# Patient Record
Sex: FEMALE | Race: WHITE | NOT HISPANIC OR LATINO | Employment: UNEMPLOYED | URBAN - NONMETROPOLITAN AREA
[De-identification: names, ages, dates, MRNs, and addresses within clinical notes are randomized per-mention and may not be internally consistent; named-entity substitution may affect disease eponyms.]

---

## 2018-06-06 ENCOUNTER — APPOINTMENT (OUTPATIENT)
Dept: CT IMAGING | Facility: HOSPITAL | Age: 28
End: 2018-06-06

## 2018-06-06 ENCOUNTER — HOSPITAL ENCOUNTER (INPATIENT)
Facility: HOSPITAL | Age: 28
LOS: 2 days | Discharge: HOME | End: 2018-06-08
Attending: PSYCHIATRY & NEUROLOGY | Admitting: PSYCHIATRY & NEUROLOGY

## 2018-06-06 ENCOUNTER — APPOINTMENT (OUTPATIENT)
Dept: GENERAL RADIOLOGY | Facility: HOSPITAL | Age: 28
End: 2018-06-06

## 2018-06-06 ENCOUNTER — HOSPITAL ENCOUNTER (EMERGENCY)
Facility: HOSPITAL | Age: 28
Discharge: STILL ADMITTED OR ADMINISTRATIVE | End: 2018-06-06
Attending: EMERGENCY MEDICINE

## 2018-06-06 VITALS
RESPIRATION RATE: 16 BRPM | DIASTOLIC BLOOD PRESSURE: 69 MMHG | HEART RATE: 73 BPM | OXYGEN SATURATION: 99 % | TEMPERATURE: 97.5 F | SYSTOLIC BLOOD PRESSURE: 101 MMHG | WEIGHT: 157.6 LBS | HEIGHT: 65 IN | BODY MASS INDEX: 26.26 KG/M2

## 2018-06-06 DIAGNOSIS — R45.851 SUICIDAL IDEATIONS: Primary | ICD-10-CM

## 2018-06-06 LAB
6-ACETYL MORPHINE: NEGATIVE
ALBUMIN SERPL-MCNC: 4.4 G/DL (ref 3.5–5)
ALBUMIN/GLOB SERPL: 1.4 G/DL (ref 1.5–2.5)
ALP SERPL-CCNC: 92 U/L (ref 35–104)
ALT SERPL W P-5'-P-CCNC: 15 U/L (ref 10–36)
AMPHET+METHAMPHET UR QL: POSITIVE
ANION GAP SERPL CALCULATED.3IONS-SCNC: 3.3 MMOL/L (ref 3.6–11.2)
APAP SERPL-MCNC: <10 MCG/ML (ref 0–200)
AST SERPL-CCNC: 18 U/L (ref 10–30)
B-HCG UR QL: NEGATIVE
BARBITURATES UR QL SCN: NEGATIVE
BASOPHILS # BLD AUTO: 0.04 10*3/MM3 (ref 0–0.3)
BASOPHILS NFR BLD AUTO: 0.5 % (ref 0–2)
BENZODIAZ UR QL SCN: NEGATIVE
BILIRUB SERPL-MCNC: 0.8 MG/DL (ref 0.2–1.8)
BUN BLD-MCNC: 16 MG/DL (ref 7–21)
BUN/CREAT SERPL: 20.8 (ref 7–25)
BUPRENORPHINE SERPL-MCNC: NEGATIVE NG/ML
CALCIUM SPEC-SCNC: 9.2 MG/DL (ref 7.7–10)
CANNABINOIDS SERPL QL: POSITIVE
CHLORIDE SERPL-SCNC: 107 MMOL/L (ref 99–112)
CO2 SERPL-SCNC: 25.7 MMOL/L (ref 24.3–31.9)
COCAINE UR QL: NEGATIVE
CREAT BLD-MCNC: 0.77 MG/DL (ref 0.43–1.29)
DEPRECATED RDW RBC AUTO: 46.1 FL (ref 37–54)
EOSINOPHIL # BLD AUTO: 0.07 10*3/MM3 (ref 0–0.7)
EOSINOPHIL NFR BLD AUTO: 0.9 % (ref 0–5)
ERYTHROCYTE [DISTWIDTH] IN BLOOD BY AUTOMATED COUNT: 14 % (ref 11.5–14.5)
GFR SERPL CREATININE-BSD FRML MDRD: 89 ML/MIN/1.73
GLOBULIN UR ELPH-MCNC: 3.2 GM/DL
GLUCOSE BLD-MCNC: 99 MG/DL (ref 70–110)
HAV IGM SERPL QL IA: ABNORMAL
HBV CORE IGM SERPL QL IA: ABNORMAL
HBV SURFACE AG SERPL QL IA: ABNORMAL
HCT VFR BLD AUTO: 41.6 % (ref 37–47)
HCV AB SER DONR QL: REACTIVE
HGB BLD-MCNC: 13.8 G/DL (ref 12–16)
HIV1+2 AB SER QL: NORMAL
HOLD SPECIMEN: NORMAL
HOLD SPECIMEN: NORMAL
IMM GRANULOCYTES # BLD: 0.01 10*3/MM3 (ref 0–0.03)
IMM GRANULOCYTES NFR BLD: 0.1 % (ref 0–0.5)
LIPASE SERPL-CCNC: 30 U/L (ref 13–60)
LYMPHOCYTES # BLD AUTO: 1.82 10*3/MM3 (ref 1–3)
LYMPHOCYTES NFR BLD AUTO: 24.7 % (ref 21–51)
MCH RBC QN AUTO: 31.2 PG (ref 27–33)
MCHC RBC AUTO-ENTMCNC: 33.2 G/DL (ref 33–37)
MCV RBC AUTO: 94.1 FL (ref 80–94)
METHADONE UR QL SCN: NEGATIVE
MONOCYTES # BLD AUTO: 0.62 10*3/MM3 (ref 0.1–0.9)
MONOCYTES NFR BLD AUTO: 8.4 % (ref 0–10)
NEUTROPHILS # BLD AUTO: 4.81 10*3/MM3 (ref 1.4–6.5)
NEUTROPHILS NFR BLD AUTO: 65.4 % (ref 30–70)
OPIATES UR QL: NEGATIVE
OSMOLALITY SERPL CALC.SUM OF ELEC: 273.2 MOSM/KG (ref 273–305)
OXYCODONE UR QL SCN: NEGATIVE
PCP UR QL SCN: NEGATIVE
PLATELET # BLD AUTO: 290 10*3/MM3 (ref 130–400)
PMV BLD AUTO: 9.9 FL (ref 6–10)
POTASSIUM BLD-SCNC: 3.6 MMOL/L (ref 3.5–5.3)
PROT SERPL-MCNC: 7.6 G/DL (ref 6–8)
RBC # BLD AUTO: 4.42 10*6/MM3 (ref 4.2–5.4)
SALICYLATES SERPL-MCNC: <1 MG/DL (ref 0–30)
SODIUM BLD-SCNC: 136 MMOL/L (ref 135–153)
TROPONIN I SERPL-MCNC: <0.006 NG/ML
WBC NRBC COR # BLD: 7.37 10*3/MM3 (ref 4.5–12.5)
WHOLE BLOOD HOLD SPECIMEN: NORMAL
WHOLE BLOOD HOLD SPECIMEN: NORMAL

## 2018-06-06 PROCEDURE — 80307 DRUG TEST PRSMV CHEM ANLYZR: CPT | Performed by: EMERGENCY MEDICINE

## 2018-06-06 PROCEDURE — 25010000002 DIPHENHYDRAMINE PER 50 MG: Performed by: EMERGENCY MEDICINE

## 2018-06-06 PROCEDURE — 93005 ELECTROCARDIOGRAM TRACING: CPT | Performed by: EMERGENCY MEDICINE

## 2018-06-06 PROCEDURE — 71275 CT ANGIOGRAPHY CHEST: CPT

## 2018-06-06 PROCEDURE — 71275 CT ANGIOGRAPHY CHEST: CPT | Performed by: RADIOLOGY

## 2018-06-06 PROCEDURE — 71045 X-RAY EXAM CHEST 1 VIEW: CPT

## 2018-06-06 PROCEDURE — 83690 ASSAY OF LIPASE: CPT | Performed by: EMERGENCY MEDICINE

## 2018-06-06 PROCEDURE — 25010000002 METHYLPREDNISOLONE PER 125 MG: Performed by: EMERGENCY MEDICINE

## 2018-06-06 PROCEDURE — 71045 X-RAY EXAM CHEST 1 VIEW: CPT | Performed by: RADIOLOGY

## 2018-06-06 PROCEDURE — 99223 1ST HOSP IP/OBS HIGH 75: CPT | Performed by: PSYCHIATRY & NEUROLOGY

## 2018-06-06 PROCEDURE — 80053 COMPREHEN METABOLIC PANEL: CPT | Performed by: EMERGENCY MEDICINE

## 2018-06-06 PROCEDURE — 85025 COMPLETE CBC W/AUTO DIFF WBC: CPT | Performed by: EMERGENCY MEDICINE

## 2018-06-06 PROCEDURE — 0 IOPAMIDOL PER 1 ML: Performed by: EMERGENCY MEDICINE

## 2018-06-06 PROCEDURE — 84484 ASSAY OF TROPONIN QUANT: CPT | Performed by: EMERGENCY MEDICINE

## 2018-06-06 PROCEDURE — 81025 URINE PREGNANCY TEST: CPT | Performed by: EMERGENCY MEDICINE

## 2018-06-06 PROCEDURE — 93010 ELECTROCARDIOGRAM REPORT: CPT | Performed by: INTERNAL MEDICINE

## 2018-06-06 PROCEDURE — G0432 EIA HIV-1/HIV-2 SCREEN: HCPCS | Performed by: PSYCHIATRY & NEUROLOGY

## 2018-06-06 PROCEDURE — 80074 ACUTE HEPATITIS PANEL: CPT | Performed by: PSYCHIATRY & NEUROLOGY

## 2018-06-06 RX ORDER — FAMOTIDINE 20 MG/1
20 TABLET, FILM COATED ORAL 2 TIMES DAILY PRN
Status: DISCONTINUED | OUTPATIENT
Start: 2018-06-06 | End: 2018-06-08 | Stop reason: HOSPADM

## 2018-06-06 RX ORDER — IBUPROFEN 600 MG/1
600 TABLET ORAL EVERY 6 HOURS PRN
Status: DISCONTINUED | OUTPATIENT
Start: 2018-06-06 | End: 2018-06-08 | Stop reason: HOSPADM

## 2018-06-06 RX ORDER — BENZTROPINE MESYLATE 1 MG/1
1 TABLET ORAL DAILY PRN
Status: DISCONTINUED | OUTPATIENT
Start: 2018-06-06 | End: 2018-06-08 | Stop reason: HOSPADM

## 2018-06-06 RX ORDER — ONDANSETRON 4 MG/1
4 TABLET, FILM COATED ORAL EVERY 6 HOURS PRN
Status: DISCONTINUED | OUTPATIENT
Start: 2018-06-06 | End: 2018-06-08 | Stop reason: HOSPADM

## 2018-06-06 RX ORDER — NICOTINE 21 MG/24HR
1 PATCH, TRANSDERMAL 24 HOURS TRANSDERMAL DAILY
Status: DISCONTINUED | OUTPATIENT
Start: 2018-06-06 | End: 2018-06-07

## 2018-06-06 RX ORDER — BENZTROPINE MESYLATE 1 MG/ML
0.5 INJECTION INTRAMUSCULAR; INTRAVENOUS DAILY PRN
Status: DISCONTINUED | OUTPATIENT
Start: 2018-06-06 | End: 2018-06-08 | Stop reason: HOSPADM

## 2018-06-06 RX ORDER — BENZONATATE 100 MG/1
100 CAPSULE ORAL 3 TIMES DAILY PRN
Status: DISCONTINUED | OUTPATIENT
Start: 2018-06-06 | End: 2018-06-08 | Stop reason: HOSPADM

## 2018-06-06 RX ORDER — DIPHENHYDRAMINE HYDROCHLORIDE 50 MG/ML
50 INJECTION INTRAMUSCULAR; INTRAVENOUS ONCE AS NEEDED
Status: DISCONTINUED | OUTPATIENT
Start: 2018-06-06 | End: 2018-06-08 | Stop reason: HOSPADM

## 2018-06-06 RX ORDER — TRAZODONE HYDROCHLORIDE 50 MG/1
50 TABLET ORAL NIGHTLY PRN
Status: DISCONTINUED | OUTPATIENT
Start: 2018-06-06 | End: 2018-06-08 | Stop reason: HOSPADM

## 2018-06-06 RX ORDER — METHYLPREDNISOLONE SODIUM SUCCINATE 125 MG/2ML
125 INJECTION, POWDER, LYOPHILIZED, FOR SOLUTION INTRAMUSCULAR; INTRAVENOUS ONCE
Status: COMPLETED | OUTPATIENT
Start: 2018-06-06 | End: 2018-06-06

## 2018-06-06 RX ORDER — HALOPERIDOL 5 MG/ML
5 INJECTION INTRAMUSCULAR ONCE AS NEEDED
Status: DISCONTINUED | OUTPATIENT
Start: 2018-06-06 | End: 2018-06-08 | Stop reason: HOSPADM

## 2018-06-06 RX ORDER — FAMOTIDINE 10 MG/ML
20 INJECTION, SOLUTION INTRAVENOUS ONCE
Status: COMPLETED | OUTPATIENT
Start: 2018-06-06 | End: 2018-06-06

## 2018-06-06 RX ORDER — SODIUM CHLORIDE 0.9 % (FLUSH) 0.9 %
10 SYRINGE (ML) INJECTION AS NEEDED
Status: DISCONTINUED | OUTPATIENT
Start: 2018-06-06 | End: 2018-06-06 | Stop reason: HOSPADM

## 2018-06-06 RX ORDER — HYDROXYZINE 50 MG/1
50 TABLET, FILM COATED ORAL EVERY 6 HOURS PRN
Status: DISCONTINUED | OUTPATIENT
Start: 2018-06-06 | End: 2018-06-08 | Stop reason: HOSPADM

## 2018-06-06 RX ORDER — OLANZAPINE 5 MG/1
5 TABLET, ORALLY DISINTEGRATING ORAL EVERY 6 HOURS PRN
Status: DISCONTINUED | OUTPATIENT
Start: 2018-06-06 | End: 2018-06-08 | Stop reason: HOSPADM

## 2018-06-06 RX ORDER — ECHINACEA PURPUREA EXTRACT 125 MG
2 TABLET ORAL AS NEEDED
Status: DISCONTINUED | OUTPATIENT
Start: 2018-06-06 | End: 2018-06-08 | Stop reason: HOSPADM

## 2018-06-06 RX ORDER — LOPERAMIDE HYDROCHLORIDE 2 MG/1
2 CAPSULE ORAL 4 TIMES DAILY PRN
Status: DISCONTINUED | OUTPATIENT
Start: 2018-06-06 | End: 2018-06-08 | Stop reason: HOSPADM

## 2018-06-06 RX ORDER — LORAZEPAM 2 MG/ML
2 INJECTION INTRAMUSCULAR ONCE AS NEEDED
Status: DISCONTINUED | OUTPATIENT
Start: 2018-06-06 | End: 2018-06-08 | Stop reason: HOSPADM

## 2018-06-06 RX ORDER — HALOPERIDOL 5 MG/ML
5 INJECTION INTRAMUSCULAR ONCE
Status: DISCONTINUED | OUTPATIENT
Start: 2018-06-06 | End: 2018-06-06

## 2018-06-06 RX ORDER — ALUMINA, MAGNESIA, AND SIMETHICONE 2400; 2400; 240 MG/30ML; MG/30ML; MG/30ML
15 SUSPENSION ORAL EVERY 6 HOURS PRN
Status: DISCONTINUED | OUTPATIENT
Start: 2018-06-06 | End: 2018-06-08 | Stop reason: HOSPADM

## 2018-06-06 RX ORDER — DIPHENHYDRAMINE HYDROCHLORIDE 50 MG/ML
25 INJECTION INTRAMUSCULAR; INTRAVENOUS ONCE
Status: COMPLETED | OUTPATIENT
Start: 2018-06-06 | End: 2018-06-06

## 2018-06-06 RX ORDER — ASPIRIN 325 MG
325 TABLET ORAL ONCE
Status: COMPLETED | OUTPATIENT
Start: 2018-06-06 | End: 2018-06-06

## 2018-06-06 RX ADMIN — DIPHENHYDRAMINE HYDROCHLORIDE 25 MG: 50 INJECTION INTRAMUSCULAR; INTRAVENOUS at 05:51

## 2018-06-06 RX ADMIN — METHYLPREDNISOLONE SODIUM SUCCINATE 125 MG: 125 INJECTION, POWDER, FOR SOLUTION INTRAMUSCULAR; INTRAVENOUS at 05:52

## 2018-06-06 RX ADMIN — FAMOTIDINE 20 MG: 10 INJECTION INTRAVENOUS at 05:49

## 2018-06-06 RX ADMIN — IOPAMIDOL 95 ML: 755 INJECTION, SOLUTION INTRAVENOUS at 06:28

## 2018-06-06 RX ADMIN — ASPIRIN 325 MG: 325 TABLET ORAL at 05:53

## 2018-06-06 NOTE — NURSING NOTE
Face to Face Evaluation for Restraint/Seculsion    Behavior Observed: PT ASSISTED BACK INTO INTAKE ROOM BY STAFF, PT CALMER BUT STILL VOICING THE NEED TO LEAVE, NO INJURY TO STAFF OR PT.    Recommendations: BRIEF HOLD DISCONTINUED.    Rochelle Buchanan RN  06/06/18  8:06 AM

## 2018-06-06 NOTE — NURSING NOTE
Talked with ED lead, JOSELYN Childers. Requested she have MD put in brief hold x 2 for [id number] and 5968. Reports that she has done so.

## 2018-06-06 NOTE — NURSING NOTE
Patient is calm at this time and does not appear agitated. She does however appear a little drowsy.  ED staff reports that she had benadryl earlier and has been resting since. Pt vague with some responses and appears withdrawn.  Patient  reports that she has been having problems with depression for years since she was a teen.currently she has no outpt providers or medications. She does report though that she has been a heavy methamphetamine user for several years and has used up to a gm daily for the past two or so years. She also admits to occasional use of pot. She denies using any other drugs or etoh. She reports that she just has a lot going on and yes she was suicidal. Noted that pt threatened to run into traffic at some point in her visit. Pt asked about this and she nodded her head up and down but refused to provider a verbal answer. Pt asked to rate anxiety and depression. Anxiety rated 10/10 and depression rated a 7/10. Patient seen and cleared medically for psych. She denies any complaints at this time. V/S stable.

## 2018-06-06 NOTE — NURSING NOTE
Patient in TX room, released from first brief hold, cursing and threatening staff, patient punched wall, pushing at staff. Place in brief hold, less than one minute until calm.

## 2018-06-06 NOTE — ED NOTES
Pt resting quietly on stretcher sleeping with ability to arouse.  Pt AAOx4 with no resp distress noted, respirations even and unlabored.  Pt denies any needs at this time.  Skin PWD. Will continue to monitor and follow plan of care.  Bed rails up x2, bed in lowest position, call light in reach.       Loulou Morales RN  06/06/18 8770

## 2018-06-06 NOTE — H&P
"INITIAL PSYCHIATRIC HISTORY & PHYSICAL    Patient Identification:  Name:   Cierra Perry  Age:   28 y.o.  Sex:   female  :   1990  MRN:   [id number]  Visit Number:   [id number]  Primary Care Physician:   No Known Provider    SUBJECTIVE    CC: agitation, suicidal ideation , recent ingestion     HPI: Cierra Perry is a 28 y.o. female who was admitted on 2018 with complaints of suicidal ideation. Patient presented to [facility] via [employer] [facility]. It is reported that she was a patient in [employer] r/t ativan overdose 2 days ago when she eloped  ICU and was taken to ER x 2 and sent here by police. Patient however, reports  when she apparently woke up and left AMA.  According to intake, patient became very agitated in ER, verbalized suicidal threats and was reportedly punching self in face, attempting to punch and kick staff requiring medication . Per note, patient stated she took 20-1 mg Ativan in effort to \" feel better\"., prior to admit at  [employer].  UDS is positive for amphetamine and thc.  Collateral information has been gathered r/t patient mental status.      Patient has endorsed history of depression and anxiety. She's reported  difficulty coping with worsening depression, low mood sadness, anxiousness. According to intake she reported history of IV substance use for last 10 years and including Methamphetamine.  According intake she reported history of pleuritic chest pain. Upon admission to the Unit the patient was noted demanding and irritable. She eventually calmed reporting  fatigue and has been resting, sleeping in room most of the shift.  Interview completed at bedside per patient request r/t  fatigue. Patient remains irritable, withdrawn and minimally cooperative. She denies current suicidal or homicidal ideation. She denies hallucination. She reports fatigue and denies any other complaints. She has reported poor sleep and appetite. She denies any complaints of chest pain or " discomfort. She was admitted to the Adult Psychiatric Unit for safety and further stabilization.         Noted see completed  XR Chest, CT angiogram chest and ECG lead     PAST PSYCHIATRIC HX:    History is unknown. Patient is poor historian at present. Will continue to attempt to gather information.     SUBSTANCE USE HX:  Uds is positive for amphetamine and THC.  See hpi for current use . No etoh.  She has reported smoking 5 ppd cigarettes and me    SOCIAL HX:  Patient is single, no children born and raised in [place], [place]. Parents were  , Mother in [facility] and Dad is , 5 Sibling. She currently lives with Mother. She is high school educated and unemployed, no  experience     Past Medical History:   Diagnosis Date   • Anxiety    • Depression    • PTSD (post-traumatic stress disorder)    • Substance abuse    • Suicide attempt     last one a few days ago. Overdose of benzodiazapines       No past surgical history on file.    No family history on file.      No prescriptions prior to admission.       Reviewed available past medical and psychiatric records.    ALLERGIES:  Geodon [ziprasidone hcl] and Shellfish-derived products    Temp:  [97.8 °F (36.6 °C)-99.3 °F (37.4 °C)] 98 °F (36.7 °C)  Heart Rate:  [72-82] 82  Resp:  [18] 18  BP: (102-112)/(63-68) 108/68    REVIEW OF SYSTEMS:  Review of Systems   Constitutional: Negative.    HENT: Negative.    Eyes: Negative.    Respiratory: Negative.    Cardiovascular: Negative.    Gastrointestinal: Negative.    Endocrine: Negative.    Genitourinary: Negative.    Musculoskeletal: Negative.    Skin: Negative.    Allergic/Immunologic: Negative.    Neurological: Negative.    Hematological: Negative.    Psychiatric/Behavioral: Negative.       See HPI for psychiatric ROS  OBJECTIVE    PHYSICAL EXAM:  Physical Exam   Constitutional: She is oriented to person, place, and time. She appears well-developed and well-nourished.   HENT:   Head: Normocephalic and  atraumatic.   Right Ear: External ear normal.   Left Ear: External ear normal.   Nose: Nose normal.   Mouth/Throat: Oropharynx is clear and moist.   Eyes: Conjunctivae and EOM are normal. Pupils are equal, round, and reactive to light.   Neck: Normal range of motion. Neck supple.   Cardiovascular: Normal rate, regular rhythm and normal heart sounds.    Pulmonary/Chest: Effort normal and breath sounds normal.   Abdominal: Soft. Bowel sounds are normal.   Musculoskeletal: Normal range of motion.   Neurological: She is alert and oriented to person, place, and time. She has normal reflexes. No cranial nerve deficit.   Skin: Skin is warm and dry.   Vitals reviewed.      MENTAL STATUS EXAM:    Cooperation:  Cooperative  Eye Contact:  Fair  Psychomotor Behavior:  Restless  Affect:  Appropriate  Hopelessness: Denies  Speech:  Normal  Thought Progress:  Preoccupied w/ going home   Thought Content:  Mood congurent  Suicidal:  None  Homicidal:  None  Hallucinations:  None  Delusion:  None  Memory:  Intact  Orientation:  Person, Time and Situation  Reliability:  fair  Insight:  Poor  Judgement:  Poor  Impulse Control:  Poor  Physical/Medical Issues:  See medical list       Imaging Results (last 24 hours)     ** No results found for the last 24 hours. **           ECG/EMG Results (most recent)     None           Lab Results   Component Value Date    GLUCOSE 99 06/06/2018    BUN 16 06/06/2018    CREATININE 0.77 06/06/2018    EGFRIFNONA 89 06/06/2018    BCR 20.8 06/06/2018    CO2 25.7 06/06/2018    CALCIUM 9.2 06/06/2018    ALBUMIN 4.40 06/06/2018    LABIL2 1.4 (L) 06/06/2018    AST 18 06/06/2018    ALT 15 06/06/2018       Lab Results   Component Value Date    WBC 7.37 06/06/2018    HGB 13.8 06/06/2018    HCT 41.6 06/06/2018    MCV 94.1 (H) 06/06/2018     06/06/2018       Pain Management Panel     Pain Management Panel Latest Ref Rng & Units 6/6/2018    AMPHETAMINES SCREEN, URINE Negative Positive(A)    BARBITURATES SCREEN  Negative Negative    BENZODIAZEPINE SCREEN, URINE Negative Negative    BUPRENORPHINE Negative Negative    COCAINE SCREEN, URINE Negative Negative    METHADONE SCREEN, URINE Negative Negative          Brief Urine Lab Results  (Last result in the past 365 days)      Color   Clarity   Blood   Leuk Est   Nitrite   Protein   CREAT   Urine HCG        06/06/18 0535               Negative           Reviewed labs and studies done with this admission.       ASSESSMENT & PLAN:      Patient Active Problem List   Diagnosis Code   • Suicidal ideation R45.851   • Severe recurrent major depression with psychotic features F33.3   • PTSD (post-traumatic stress disorder) F43.10   • Methamphetamine dependence F15.20   • Cannabis dependence, continuous F12.20     The patient is high risk due to severe depression, suicidal ideation, psychosis, recent overdose and substance use.    The patient has been admitted for safety and stabilization.  Patient will be monitored for suicidality daily and maintained on Suicide precaution Level 3 (q15 min checks) .  The patient will have individual and group therapy with a master's level therapist. A master treatment plan will be developed and agreed upon by the patient and his/her treatment team.  The patient's estimated length of stay in the hospital is 5-7 days.       This note was generated using a scribe, Nadiya Andersen RN .  The work documented in this note was completed, reviewed, and approved by the attending psychiatrist as designated Dr. ISADORA archibald.

## 2018-06-06 NOTE — NURSING NOTE
Talked with leadLiseth RN, requested she ask MD to put in restraint orders, and to DC them. Rochelle Duenas RN aware.

## 2018-06-06 NOTE — ED NOTES
Patient states that she has an allergy to shellfish and shrimp and is unsure if she can have the IV contrast dye. Provider notified, states the dye is not the same but will pre medicate patient before CT scan, patient verbalizes understanding and is agreeable to CT scan.     Deepak Berman RN  06/06/18 0663

## 2018-06-06 NOTE — NURSING NOTE
Pt moved to ED 15 for medical clearance. Pt calm, tearful. Able to answer questions. No acute distress noted.

## 2018-06-06 NOTE — ED PROVIDER NOTES
"Subjective   Patient is a 28-year-old lady who was brought in by police.  Apparently the patient was seen for an Ativan overdose 2 days ago at [place] emergency department was admitted to the ICU.  Earlier today the patient eloped from the ICU and was taken back to the ER.  The patient left the ER 2 different times and in the police brought her here.  The patient cannot give me a reason why she took as many Ativan as she did.  She simply states \"I have really high anxiety and needed it\".  Patient currently this denies suicidal or homicidal ideation.  That said she has a history of suicide attempts in the past.  When I first examined her in the presence of the psych nurse she said \"just let me leave and I will   walk in front of a train because I am suicidal\" area and the patient also states that she's been having some pleuritic chest pain.  She has a history of IV drug abuse for the last 10 years.  She denies a history of endocarditis. No f/c/n/v/abd pain.              Review of Systems   Constitutional: Negative.  Negative for fever.   HENT: Negative.    Respiratory: Positive for shortness of breath.    Cardiovascular: Positive for chest pain.   Gastrointestinal: Negative.  Negative for abdominal pain.   Endocrine: Negative.    Genitourinary: Negative.  Negative for dysuria.   Skin: Negative.    Neurological: Negative.    Psychiatric/Behavioral: Negative.    All other systems reviewed and are negative.      Past Medical History:   Diagnosis Date   • Anxiety    • Depression    • PTSD (post-traumatic stress disorder)        Allergies   Allergen Reactions   • Geodon [Ziprasidone Hcl]      rash   • Shellfish-Derived Products Swelling       History reviewed. No pertinent surgical history.    History reviewed. No pertinent family history.    Social History     Social History   • Marital status: Single     Social History Main Topics   • Smoking status: Current Every Day Smoker     Packs/day: 5.00     Types: " Cigarettes   • Alcohol use No   • Drug use: Yes     Types: Methamphetamines, IV     Other Topics Concern   • Not on file           Objective   Physical Exam   Constitutional: She is oriented to person, place, and time. She appears well-developed and well-nourished. No distress.   HENT:   Head: Normocephalic and atraumatic.   Right Ear: External ear normal.   Left Ear: External ear normal.   Nose: Nose normal.   Eyes: Conjunctivae and EOM are normal. Pupils are equal, round, and reactive to light.   Neck: Normal range of motion. Neck supple. No JVD present. No tracheal deviation present.   Cardiovascular: Normal rate, regular rhythm and normal heart sounds.    No murmur heard.  Pulmonary/Chest: Effort normal and breath sounds normal. No respiratory distress. She has no wheezes.   Abdominal: Soft. Bowel sounds are normal. There is no tenderness.   Musculoskeletal: Normal range of motion. She exhibits no edema or deformity.   Neurological: She is alert and oriented to person, place, and time. No cranial nerve deficit.   Skin: Skin is warm and dry. Capillary refill takes less than 2 seconds. She is not diaphoretic. No erythema. No pallor.   Psychiatric:   Flat affect.  She became violent and angry shortly after I started talking to her.  She was able to calmed down after being spoken with.  The patient denies homicidal or suicidal ideation currently but  when I first examined her she said she did want to walk in front of a train was she suicidal.  She now states she only said that because she was angry.  She apparenlty said she wanted to hurt herself at the outside ER earlier this evening as well.    Vitals reviewed.      Procedures           ED Course  ED Course as of Jun 06 0746 Wed Jun 06, 2018   0705 Normal sinus, normal axis, normal interval, normal QRS. Nonspecific st/t wave changes no STEMI criteria    [NB]   0714 No CT head is warranted at this time.  Patient is alert and oriented ×3.  She has normal  neurologic exam.  [NB]   0729 Pt signed out to dr razo at shift change  [NB]      ED Course User Index  [NB] Jason Ladd MD                  MDM  Number of Diagnoses or Management Options     Amount and/or Complexity of Data Reviewed  Clinical lab tests: ordered and reviewed          Final diagnoses:   None            Jason Ladd MD  06/06/18 9857

## 2018-06-06 NOTE — NURSING NOTE
"Talking with patient about who brought her in and why. Pt upset, requesting to have phone back and to go outside to smoke. Rules explained. Pt cursing staff, denies suicidal ideation, reports she had been in ICU at [facility], she left AMA because they wouldn't let her smoke. She then went back to the ED at [facility] who sent her here by police. Pt becomes upset with questioning, punches self in face, screams that she will leave and kill herself that she is suicidal, and states that it's \"untelling who she will kill.\" Demanding to smoke, attempted to leave and stopped by staff. Brief hold in place. Pt walked to TX room by staff/security. Pt cursing, trying to punch and kick staff.   "

## 2018-06-06 NOTE — NURSING NOTE
Patient states that she has changed her mind and wants her fucking clothes to get the hell out of here. Explained hold process to patient and instructed her that she was on a 72 hr hold and that she could discuss it further with the dr once she sees him. Security present and patient escorted to floor with belongings.

## 2018-06-06 NOTE — ED NOTES
Report received from Elodia Berman RN at bedside.  Pt resting quietly on stretcher sleeping with ability to arouse.  Pt AAOx4 with no resp distress noted, respirations even and unlabored.  Pt denies any needs at this time.  Skin PWD. Will continue to monitor and follow plan of care.  Bed rails up x2, bed in lowest position, call light in reach.       Loulou Morales RN  06/06/18 6882

## 2018-06-06 NOTE — ED NOTES
"Patient presents to the ER tonight due to increased anxiety and depression and that has been ongoing. Patient states that she has been in [facility] multiple times, states that she was admitted to the ICU in [place] and left yesterday morning after she woke up. Patient states that she was admitted after taking twenty 1 mg ativan tablets in attempt to try to \"feel better.\" Patient states that she was not trying to harm herself when taking the medication. Patient states that she walked out, but reports that her depression has worsened and wanted to be seen. When patient arrived patient became uncooperative and attempted to leave after saying \"I'm just going to throw myself out in front of a bus.\" Patient cooperative at this time, states that she has a lot going on at home and states she would be better off if she were dead. Patient states that she has a history of IV methamphetamine use, reports that she also has a cardiac history and reports that she has been experiencing chest pain for the past 4 days, states that she was seen in the ER at [facility], but reports that she was told that her heart checked out okay. Patient updated on plan of care, patient placed in room in front of nurses station. Fall reduction program in place, respirations are even and unlabored, NADN, will continue to monitor and follow plan of care.     Deepak Berman RN  06/06/18 0609    "

## 2018-06-06 NOTE — NURSING NOTE
Called and spoke to Dr. Lundberg. Reviewed information and labs and notes from ED chart. Instructed to admit pt and place her on a 72 hour hold. Rbvox2. Pt and ed provider made aware of plan of care.

## 2018-06-07 PROBLEM — F33.3 SEVERE RECURRENT MAJOR DEPRESSION WITH PSYCHOTIC FEATURES (HCC): Status: ACTIVE | Noted: 2018-06-07

## 2018-06-07 PROBLEM — F43.10 PTSD (POST-TRAUMATIC STRESS DISORDER): Status: ACTIVE | Noted: 2018-06-07

## 2018-06-07 PROBLEM — F15.20 METHAMPHETAMINE DEPENDENCE (HCC): Status: ACTIVE | Noted: 2018-06-07

## 2018-06-07 PROBLEM — F12.20 CANNABIS DEPENDENCE, CONTINUOUS (HCC): Status: ACTIVE | Noted: 2018-06-07

## 2018-06-07 PROCEDURE — 99232 SBSQ HOSP IP/OBS MODERATE 35: CPT | Performed by: PSYCHIATRY & NEUROLOGY

## 2018-06-07 RX ORDER — VENLAFAXINE HYDROCHLORIDE 37.5 MG/1
37.5 CAPSULE, EXTENDED RELEASE ORAL
Status: DISCONTINUED | OUTPATIENT
Start: 2018-06-07 | End: 2018-06-08 | Stop reason: HOSPADM

## 2018-06-07 RX ORDER — LAMOTRIGINE 100 MG/1
25 TABLET ORAL DAILY
Status: DISCONTINUED | OUTPATIENT
Start: 2018-06-07 | End: 2018-06-08 | Stop reason: HOSPADM

## 2018-06-07 RX ORDER — LURASIDONE HYDROCHLORIDE 40 MG/1
40 TABLET, FILM COATED ORAL DAILY
Status: DISCONTINUED | OUTPATIENT
Start: 2018-06-07 | End: 2018-06-08 | Stop reason: HOSPADM

## 2018-06-07 RX ADMIN — LAMOTRIGINE 25 MG: 100 TABLET ORAL at 14:31

## 2018-06-07 RX ADMIN — LURASIDONE HYDROCHLORIDE 40 MG: 40 TABLET, FILM COATED ORAL at 14:30

## 2018-06-07 RX ADMIN — NICOTINE POLACRILEX 2 MG: 2 GUM, CHEWING ORAL at 18:19

## 2018-06-07 RX ADMIN — VENLAFAXINE HYDROCHLORIDE 37.5 MG: 37.5 CAPSULE, EXTENDED RELEASE ORAL at 14:30

## 2018-06-07 RX ADMIN — HYDROXYZINE HYDROCHLORIDE 50 MG: 50 TABLET ORAL at 07:27

## 2018-06-07 NOTE — PROGRESS NOTES
Navigator is assisting primary therapist with discharge planning. A referral was sent to [facility] on this date for review. Patient has also consented to [facility].    [facility]  [phone]

## 2018-06-07 NOTE — DISCHARGE PLACEMENT REQUEST
"Cierra Barakat  (28 y.o. Female)     Date of Birth Social Security Number Address Home Phone MRN    1990  [address]   [place] [address] [phone] [id number]    Oriental orthodox Marital Status          None Single       Admission Date Admission Type Admitting Provider Attending Provider Department, Room/Bed    18 Emergency Bradly Lundberg MD Salim, Mazhar, MD [facility] ADULT PSYCHIATRIC 2, 1038/1S    Discharge Date Discharge Disposition Discharge Destination                       Attending Provider:  Luis Winter MD    Allergies:  Geodon [Ziprasidone Hcl], Shellfish-derived Products    Isolation:  None   Infection:  None   Code Status:  FULL    Ht:  165.1 cm (65\")   Wt:  70.3 kg (155 lb)    Admission Cmt:  None   Principal Problem:  None                Active Insurance as of 2018     Primary Coverage     Payor Plan Insurance Group Employer/Plan Group    [id number]      Payor Plan Address Payor Plan Phone Number Effective From Effective To    [address]     [facility] [id number]       Subscriber Name Subscriber Birth Date Member ID       CIERRA BARAKAT 1990 [id number]                 Emergency Contacts      (Rel.) Home Phone Work Phone Mobile Phone    Grace Barakat (Mother) [phone] -- --            Insurance Information                [employer] Phone:     Subscriber: Cierra Barakat Subscriber#: [id number]    Group#:  Precert#:              History & Physical      Luis Winter MD at 2018  2:20 PM          INITIAL PSYCHIATRIC HISTORY & PHYSICAL    Patient Identification:  Name:   Cierra Barakat  Age:   28 y.o.  Sex:   female  :   1990  MRN:   [id number]  Visit Number:   [id number]  Primary Care Physician:   No Known Provider    SUBJECTIVE    CC: agitation, suicidal ideation , recent ingestion     HPI: Cierra Barakat is a 28 y.o. female who was admitted on 2018 with complaints of suicidal ideation. " "Patient presented to [facility] via [employer] [facility]. It is reported that she was a patient in [facility] r/t ativan overdose 2 days ago when she eloped  ICU and was taken to ER x 2 and sent here by police. Patient however, reports  when she apparently woke up and left AMA.  According to intake, patient became very agitated in ER, verbalized suicidal threats and was reportedly punching self in face, attempting to punch and kick staff requiring medication . Per note, patient stated she took 20-1 mg Ativan in effort to \" feel better\"., prior to admit at  [facility].  UDS is positive for amphetamine and thc.  Collateral information has been gathered r/t patient mental status.      Patient has endorsed history of depression and anxiety. She's reported  difficulty coping with worsening depression, low mood sadness, anxiousness. According to intake she reported history of IV substance use for last 10 years and including Methamphetamine.  According intake she reported history of pleuritic chest pain. Upon admission to the Unit the patient was noted demanding and irritable. She eventually calmed reporting  fatigue and has been resting, sleeping in room most of the shift.  Interview completed at bedside per patient request r/t  fatigue. Patient remains irritable, withdrawn and minimally cooperative. She denies current suicidal or homicidal ideation. She denies hallucination. She reports fatigue and denies any other complaints. She has reported poor sleep and appetite. She denies any complaints of chest pain or discomfort. She was admitted to the Adult Psychiatric Unit for safety and further stabilization.         Noted see completed  XR Chest, CT angiogram chest and ECG lead     PAST PSYCHIATRIC HX:    History is unknown. Patient is poor historian at present. Will continue to attempt to gather information.     SUBSTANCE USE HX:  Uds is positive for amphetamine and THC.  See hpi for current use . No etoh.  She has reported " smoking 5 ppd cigarettes and me    SOCIAL HX:  Patient is single, no children born and raised in [place], [place]. Parents were  , Mother in [facility] and Dad is , 5 Sibling. She currently lives with Mother. She is high school educated and unemployed, no  experience     Past Medical History:   Diagnosis Date   • Anxiety    • Depression    • PTSD (post-traumatic stress disorder)    • Substance abuse    • Suicide attempt     last one a few days ago. Overdose of benzodiazapines       No past surgical history on file.    No family history on file.      No prescriptions prior to admission.       Reviewed available past medical and psychiatric records.    ALLERGIES:  Geodon [ziprasidone hcl] and Shellfish-derived products    Temp:  [97.8 °F (36.6 °C)-99.3 °F (37.4 °C)] 98 °F (36.7 °C)  Heart Rate:  [72-82] 82  Resp:  [18] 18  BP: (102-112)/(63-68) 108/68    REVIEW OF SYSTEMS:  Review of Systems   Constitutional: Negative.    HENT: Negative.    Eyes: Negative.    Respiratory: Negative.    Cardiovascular: Negative.    Gastrointestinal: Negative.    Endocrine: Negative.    Genitourinary: Negative.    Musculoskeletal: Negative.    Skin: Negative.    Allergic/Immunologic: Negative.    Neurological: Negative.    Hematological: Negative.    Psychiatric/Behavioral: Negative.       See HPI for psychiatric ROS  OBJECTIVE    PHYSICAL EXAM:  Physical Exam   Constitutional: She is oriented to person, place, and time. She appears well-developed and well-nourished.   HENT:   Head: Normocephalic and atraumatic.   Right Ear: External ear normal.   Left Ear: External ear normal.   Nose: Nose normal.   Mouth/Throat: Oropharynx is clear and moist.   Eyes: Conjunctivae and EOM are normal. Pupils are equal, round, and reactive to light.   Neck: Normal range of motion. Neck supple.   Cardiovascular: Normal rate, regular rhythm and normal heart sounds.    Pulmonary/Chest: Effort normal and breath sounds normal.   Abdominal: Soft.  Bowel sounds are normal.   Musculoskeletal: Normal range of motion.   Neurological: She is alert and oriented to person, place, and time. She has normal reflexes. No cranial nerve deficit.   Skin: Skin is warm and dry.   Vitals reviewed.      MENTAL STATUS EXAM:    Cooperation:  Cooperative  Eye Contact:  Fair  Psychomotor Behavior:  Restless  Affect:  Appropriate  Hopelessness: Denies  Speech:  Normal  Thought Progress:  Preoccupied w/ going home   Thought Content:  Mood congurent  Suicidal:  None  Homicidal:  None  Hallucinations:  None  Delusion:  None  Memory:  Intact  Orientation:  Person, Time and Situation  Reliability:  fair  Insight:  Poor  Judgement:  Poor  Impulse Control:  Poor  Physical/Medical Issues:  See medical list       Imaging Results (last 24 hours)     ** No results found for the last 24 hours. **           ECG/EMG Results (most recent)     None           Lab Results   Component Value Date    GLUCOSE 99 06/06/2018    BUN 16 06/06/2018    CREATININE 0.77 06/06/2018    EGFRIFNONA 89 06/06/2018    BCR 20.8 06/06/2018    CO2 25.7 06/06/2018    CALCIUM 9.2 06/06/2018    ALBUMIN 4.40 06/06/2018    LABIL2 1.4 (L) 06/06/2018    AST 18 06/06/2018    ALT 15 06/06/2018       Lab Results   Component Value Date    WBC 7.37 06/06/2018    HGB 13.8 06/06/2018    HCT 41.6 06/06/2018    MCV 94.1 (H) 06/06/2018     06/06/2018       Pain Management Panel     Pain Management Panel Latest Ref Rng & Units 6/6/2018    AMPHETAMINES SCREEN, URINE Negative Positive(A)    BARBITURATES SCREEN Negative Negative    BENZODIAZEPINE SCREEN, URINE Negative Negative    BUPRENORPHINE Negative Negative    COCAINE SCREEN, URINE Negative Negative    METHADONE SCREEN, URINE Negative Negative          Brief Urine Lab Results  (Last result in the past 365 days)      Color   Clarity   Blood   Leuk Est   Nitrite   Protein   CREAT   Urine HCG        06/06/18 0535               Negative           Reviewed labs and studies done with  "this admission.       ASSESSMENT & PLAN:      Patient Active Problem List   Diagnosis Code   • Suicidal ideation R45.851   • Severe recurrent major depression with psychotic features F33.3   • PTSD (post-traumatic stress disorder) F43.10   • Methamphetamine dependence F15.20   • Cannabis dependence, continuous F12.20     The patient is high risk due to severe depression, suicidal ideation, psychosis, recent overdose and substance use.    The patient has been admitted for safety and stabilization.  Patient will be monitored for suicidality daily and maintained on Suicide precaution Level 3 (q15 min checks) .  The patient will have individual and group therapy with a master's level therapist. A master treatment plan will be developed and agreed upon by the patient and his/her treatment team.  The patient's estimated length of stay in the hospital is 5-7 days.       This note was generated using a scribe, Nadiya Andersen RN .  The work documented in this note was completed, reviewed, and approved by the attending psychiatrist as designated Dr. ISADORA Winter  signature.       Electronically signed by Luis Winter MD at 6/7/2018 12:47 PM       Hospital Medications (active)       Dose Frequency Start End    aluminum-magnesium hydroxide-simethicone (MAALOX MAX) 400-400-40 MG/5ML suspension 15 mL 15 mL Every 6 Hours PRN 6/6/2018     Sig - Route: Take 15 mL by mouth Every 6 (Six) Hours As Needed for Indigestion or Heartburn. - Oral    benzonatate (TESSALON) capsule 100 mg 100 mg 3 Times Daily PRN 6/6/2018     Sig - Route: Take 1 capsule by mouth 3 (Three) Times a Day As Needed for Cough. - Oral    benztropine (COGENTIN) injection 0.5 mg 0.5 mg Daily PRN 6/6/2018     Sig - Route: Inject 0.5 mL into the shoulder, thigh, or buttocks Daily As Needed (tremors). - Intramuscular    Linked Group 1:  \"Or\" Linked Group Details        benztropine (COGENTIN) tablet 1 mg 1 mg Daily PRN 6/6/2018     Sig - Route: Take 1 tablet by mouth Daily As " "Needed for Tremors. - Oral    Linked Group 1:  \"Or\" Linked Group Details        diphenhydrAMINE (BENADRYL) injection 50 mg 50 mg Once As Needed 6/6/2018     Sig - Route: Inject 1 mL into the shoulder, thigh, or buttocks 1 (One) Time As Needed for Itching. - Intramuscular    famotidine (PEPCID) tablet 20 mg 20 mg 2 Times Daily PRN 6/6/2018     Sig - Route: Take 1 tablet by mouth 2 (Two) Times a Day As Needed for Heartburn. - Oral    haloperidol lactate (HALDOL) injection 5 mg 5 mg Once As Needed 6/6/2018     Sig - Route: Inject 1 mL into the shoulder, thigh, or buttocks 1 (One) Time As Needed for Agitation. - Intramuscular    hydrOXYzine (ATARAX) tablet 50 mg 50 mg Every 6 Hours PRN 6/6/2018     Sig - Route: Take 1 tablet by mouth Every 6 (Six) Hours As Needed for Anxiety. - Oral    ibuprofen (ADVIL,MOTRIN) tablet 600 mg 600 mg Every 6 Hours PRN 6/6/2018     Sig - Route: Take 1 tablet by mouth Every 6 (Six) Hours As Needed for Mild Pain  or Moderate Pain  (severe pain (7-10)). - Oral    lamoTRIgine (LaMICtal) tablet 25 mg 25 mg Daily 6/7/2018     Sig - Route: Take 0.25 tablets by mouth Daily. - Oral    loperamide (IMODIUM) capsule 2 mg 2 mg 4 Times Daily PRN 6/6/2018     Sig - Route: Take 1 capsule by mouth 4 (Four) Times a Day As Needed for Diarrhea. - Oral    LORazepam (ATIVAN) injection 2 mg 2 mg Once As Needed 6/6/2018     Sig - Route: Inject 1 mL into the shoulder, thigh, or buttocks 1 (One) Time As Needed for Anxiety. - Intramuscular    lurasidone (LATUDA) tablet 40 mg 40 mg Daily 6/7/2018     Sig - Route: Take 1 tablet by mouth Daily. - Oral    magnesium hydroxide (MILK OF MAGNESIA) suspension 2400 mg/10mL 10 mL 10 mL Daily PRN 6/6/2018     Sig - Route: Take 10 mL by mouth Daily As Needed for Constipation. - Oral    nicotine polacrilex (NICORETTE) gum 2 mg 2 mg Every 1 Hour PRN 6/7/2018     Sig - Route: Chew 1 each Every 1 (One) Hour As Needed for Smoking Cessation. - Mouth/Throat    OLANZapine zydis (zyPREXA) " disintegrating tablet 5 mg 5 mg Every 6 Hours PRN 2018     Sig - Route: Take 1 tablet by mouth Every 6 (Six) Hours As Needed (prn agitation.). - Oral    ondansetron (ZOFRAN) tablet 4 mg 4 mg Every 6 Hours PRN 2018     Sig - Route: Take 1 tablet by mouth Every 6 (Six) Hours As Needed for Nausea or Vomiting. - Oral    sodium chloride (OCEAN) nasal spray 2 spray 2 spray As Needed 2018     Sig - Route: 2 sprays by Each Nare route As Needed for Congestion. - Each Nare    traZODone (DESYREL) tablet 50 mg 50 mg Nightly PRN 2018     Sig - Route: Take 1 tablet by mouth At Night As Needed for Sleep. - Oral    venlafaxine XR (EFFEXOR-XR) 24 hr capsule 37.5 mg 37.5 mg Daily With Breakfast 2018     Sig - Route: Take 1 capsule by mouth Daily With Breakfast. - Oral    haloperidol lactate (HALDOL) injection 5 mg (Discontinued) 5 mg Once 2018    Sig - Route: Inject 1 mL into the shoulder, thigh, or buttocks 1 (One) Time. - Intramuscular    nicotine (NICODERM CQ) 21 MG/24HR patch 1 patch (Discontinued) 1 patch Daily 2018    Sig - Route: Place 1 patch on the skin Daily. - Transdermal             Physician Progress Notes (last 72 hours) (Notes from 2018  2:10 PM through 2018  2:10 PM)      Luis Winter MD at 2018 12:51 PM          INPATIENT PSYCHIATRIC PROGRESS NOTE    Name:  Cierra Perry  :  1990  MRN:  [id number]  Visit Number:  [id number]  Length of stay:  1    SUBJECTIVE  CC: depression    INTERVAL HISTORY:  The patient is very irritable and labile and is insisting on leaving. States that she doesn't remember how she got to the hospital. She states that she remembers taking some drugs and didn't want to name the drugs, but acknowledged that it was meth, and added that she has been taking them for last fifteen years, shoots up IV meth 2-3 gm daily. She denies taking an overdose of nerve pills, but wanted to be started on Ativan 2 mg tid. She reports that when she  "was in [place], she took Pristiq, Lamictal and Latuda.      Review of Systems  Pt denies any problems at this time as she is fixated on leaving and threatened to bring this place down if she is not allowed to leave.    OBJECTIVE    Temp:  [97.8 °F (36.6 °C)-99.3 °F (37.4 °C)] 98 °F (36.7 °C)  Heart Rate:  [72-82] 82  Resp:  [18] 18  BP: (102-112)/(63-68) 108/68    MENTAL STATUS EXAM:  Appearance:Casually dressed, good hygeine.   Cooperation:Cooperative  Psychomotor: No psychomotor agitation/retardation, No EPS, No motor tics  Speech-normal rate, amount.  Mood \"irritable\"   Affect-congruent, appropriate, stable  Thought Content-goal directed, no delusional material present  Thought process-linear, organized.  Suicidality: No SI  Homicidality: No HI  Perception: No AH/VH  Insight-fair   Judgement-poor    Lab Results (last 24 hours)     Procedure Component Value Units Date/Time    Hepatitis Panel, Acute [[id number]]  (Abnormal) Collected:  06/06/18 1309    Specimen:  Blood Updated:  06/06/18 1456     Hepatitis B Surface Ag Non-Reactive     Hep A IgM Non-Reactive     Hep B C IgM Non-Reactive     Hepatitis C Ab Reactive (A)    HIV-1 / O / 2 Ag / Antibody 4th Generation [[id number]]  (Normal) Collected:  06/06/18 1309    Specimen:  Blood Updated:  06/06/18 1443     HIV-1/ HIV-2 Non-Reactive    Narrative:         A non-reactive test result does not preclude the possibility of exposure to HIV or infection with HIV. An antibody response to recent exposure may take several months to reach detectable levels.             Imaging Results (last 24 hours)     ** No results found for the last 24 hours. **             ECG/EMG Results (most recent)     None           ALLERGIES: Geodon [ziprasidone hcl] and Shellfish-derived products      Current Facility-Administered Medications:   •  aluminum-magnesium hydroxide-simethicone (MAALOX MAX) 400-400-40 MG/5ML suspension 15 mL, 15 mL, Oral, Q6H PRN, Bradly Lundberg MD  •  benzonatate " (TESSALON) capsule 100 mg, 100 mg, Oral, TID PRN, Bradly Lundberg MD  •  benztropine (COGENTIN) tablet 1 mg, 1 mg, Oral, Daily PRN **OR** benztropine (COGENTIN) injection 0.5 mg, 0.5 mg, Intramuscular, Daily PRN, Bradly Lundberg MD  •  diphenhydrAMINE (BENADRYL) injection 50 mg, 50 mg, Intramuscular, Once PRN, Dru Morales MD  •  famotidine (PEPCID) tablet 20 mg, 20 mg, Oral, BID PRN, Bradly Lundberg MD  •  haloperidol lactate (HALDOL) injection 5 mg, 5 mg, Intramuscular, Once PRN, Dru Morales MD  •  hydrOXYzine (ATARAX) tablet 50 mg, 50 mg, Oral, Q6H PRN, Bradly Lundberg MD, 50 mg at 06/07/18 0727  •  ibuprofen (ADVIL,MOTRIN) tablet 600 mg, 600 mg, Oral, Q6H PRN, Bradly Lundberg MD  •  lamoTRIgine (LaMICtal) tablet 25 mg, 25 mg, Oral, Daily, Luis Winter MD  •  loperamide (IMODIUM) capsule 2 mg, 2 mg, Oral, 4x Daily PRN, Bradly Lundberg MD  •  LORazepam (ATIVAN) injection 2 mg, 2 mg, Intramuscular, Once PRN, Dru Morales MD  •  lurasidone (LATUDA) tablet 40 mg, 40 mg, Oral, Daily, Luis Winter MD  •  magnesium hydroxide (MILK OF MAGNESIA) suspension 2400 mg/10mL 10 mL, 10 mL, Oral, Daily PRN, Bradly Lundberg MD  •  nicotine (NICODERM CQ) 21 MG/24HR patch 1 patch, 1 patch, Transdermal, Daily, Bradly Lundberg MD  •  OLANZapine zydis (zyPREXA) disintegrating tablet 5 mg, 5 mg, Oral, Q6H PRN, Bradly Lundberg MD  •  ondansetron (ZOFRAN) tablet 4 mg, 4 mg, Oral, Q6H PRN, Bradly Lundberg MD  •  sodium chloride (OCEAN) nasal spray 2 spray, 2 spray, Each Nare, PRN, Bradly Lundberg MD  •  traZODone (DESYREL) tablet 50 mg, 50 mg, Oral, Nightly PRN, Bradly Lundebrg MD  •  venlafaxine XR (EFFEXOR-XR) 24 hr capsule 37.5 mg, 37.5 mg, Oral, Daily With Breakfast, Luis Winter MD    ASSESSMENT & PLAN:    Active Problems:    Suicidal ideation  Plan: SP3      Severe recurrent major depression with psychotic features  Plan: Effexor XR 37.5 mg  daily, Latuda 40 mg daily, Lamictal 25 mg daily      PTSD (post-traumatic stress disorder)  Plan: Effexor and Latuda      Methamphetamine dependence  Plan: Substance use counseling, supportive treatment.      Cannabis dependence, continuous  Plan: Substance use counseling, supportive treatment.      Suicide precautions: Suicide precaution Level 3 (q15 min checks)     Behavioral Health Treatment Plan and Problem List: I have reviewed and approved the Behavioral Health Treatment Plan and Problem list.  The patient has had a chance to review and agrees with the treatment plan.     Clinician:  Luis Winter MD  06/07/18  12:51 PM    Electronically signed by Luis Winter MD at 6/7/2018 12:56 PM

## 2018-06-07 NOTE — PLAN OF CARE
Problem: Patient Care Overview  Goal: Plan of Care Review  Outcome: Ongoing (interventions implemented as appropriate)   06/07/18 1521   Coping/Psychosocial   Plan of Care Reviewed With patient   Coping/Psychosocial   Patient Agreement with Plan of Care agrees   Plan of Care Review   Progress no change   OTHER   Outcome Summary Completed initial assessment, discussed alternative aftercare resources and expectations of treatment; reviewed treatment plan.   Coping/Psychosocial   Consent Given to Review Plan with [facility] and [facility] We[facility].     Goal: Individualization and Mutuality  Outcome: Ongoing (interventions implemented as appropriate)   06/07/18 1521   Personal Strengths/Vulnerabilities   Patient Personal Strengths expressive of emotions;expressive of needs;resilient   Patient Vulnerabilities Ineffective coping skills, poor insight, impulsivity.   Individualization   Patient Specific Preferences Mood stabilization.   Patient Specific Goals (Include Timeframe) Identify 3 healthy coping skills, deny all SI, HI, and AVH.   Patient Specific Interventions Illness education, scheduling aftercare.   Mutuality/Individual Preferences   What Anxieties, Fears, Concerns, or Questions Do You Have About Your Care? None   What Information Would Help Us Give You More Personalized Care? None     Goal: Discharge Needs Assessment  Outcome: Ongoing (interventions implemented as appropriate)   06/07/18 1521   Discharge Needs Assessment   Readmission Within the Last 30 Days no previous admission in last 30 days   Concerns to be Addressed coping/stress;decision making;discharge planning;mental health;substance/tobacco abuse/use;suicidal   Patient/Family Anticipates Transition to other (see comments)  (Substance abuse rehab.)   Patient/Family Anticipated Services at Transition rehabilitation services;mental health services   Transportation Concerns car, none   Transportation Anticipated public transportation   Patient's Choice of  Community Agency(s) [facility] or [facility].   Current Discharge Risk substance use/abuse;psychiatric illness   Discharge Coordination/Progress Patient anticipated to have [facility] referral upon discharge. Patient has [employer] insurance for medications.   Discharge Needs Assessment,    Outpatient/Agency/Support Group Needs residential services   Anticipated Discharge Disposition other health care institution not elsewhere defined  (Rehab.)     Goal: Interprofessional Rounds/Family Conf  Outcome: Ongoing (interventions implemented as appropriate)   06/07/18 1521   Interdisciplinary Rounds/Family Conf   Summary Staffed with RN.   Interdisciplinary Rounds/Family Conf   Participants nursing;social work   DATA:           Therapist met individually with patient this date to introduce role and to discuss hospitalization expectations. Patient agreeable.        Therapist completed integrated summary, treatment plan, and initiated social history this date. Therapist is strongly recommending a family session prior to discharge.  Patient refuses family involvement today.          ASSESSMENT:       Cierra is a 28 year-old, single,  female [place].  She was referred by police and presents as involuntary admit with reports of suicidal ideations and attempted overdose.  Patient discussed stressors of lacking support system, no income, ongoing substance use, and no transportation.  Patient reports feeling hopeless, helpless, and worthless.  She reported she feels no on cares if she lives or dies.  Patient reported long history of substance use for last 10 years.  She reports abusing IV meth 1 gram daily and also marijuana use daily.  She reported poor sleep and feeling agitated.  Patient reported fair sleep.  She appeared unkempt and somewhat irritable.  Patient oriented x3 and displayed poor insight.  Patient reports to be essentially homeless but could live with her mother upon discharge.  Therapist strongly  encouraged patient to consider residential rehab and patient agreeable for referrals to be sent.  Patient denied legal issues at present.  She denied HI and AVH.            PLAN:       Treatment team will focus efforts on stabilizing patient's acute symptoms while providing education on healthy coping and crisis management to reduce hospitalizations. Patient requires daily psychiatrist evaluation and 24/7 nursing supervision to promote patient safety.      Therapist will offer 1-4 individual sessions (20-30 minutes each), 1 therapy group daily, family education, and appropriate referral.      Therapist recommends residential or intensive outpatient treatment at this time; Patient consented for [facility] and [facility].  Navigator to assist.      Therapist will continue to encourage patient to involve family with treatment.

## 2018-06-07 NOTE — PLAN OF CARE
Problem: Patient Care Overview  Goal: Plan of Care Review  Outcome: Ongoing (interventions implemented as appropriate)   06/07/18 0126   Coping/Psychosocial   Plan of Care Reviewed With patient   Coping/Psychosocial   Patient Agreement with Plan of Care agrees   Plan of Care Review   Progress no change   OTHER   Outcome Summary Pt got upset over 72 hour hold this shift. Knocked over chair in dayroom. Able to be redirected by staff. Went to room and remained calm the remainder of the shift.

## 2018-06-07 NOTE — PROGRESS NOTES
"INPATIENT PSYCHIATRIC PROGRESS NOTE    Name:  Cierra Perry  :  1990  MRN:  [id number]  Visit Number:  [id number]  Length of stay:  1    SUBJECTIVE  CC: depression    INTERVAL HISTORY:  The patient is very irritable and labile and is insisting on leaving. States that she doesn't remember how she got to the hospital. She states that she remembers taking some drugs and didn't want to name the drugs, but acknowledged that it was meth, and added that she has been taking them for last fifteen years, shoots up IV meth 2-3 gm daily. She denies taking an overdose of nerve pills, but wanted to be started on Ativan 2 mg tid. She reports that when she was in [place], she took Pristiq, Lamictal and Latuda.      Review of Systems  Pt denies any problems at this time as she is fixated on leaving and threatened to bring this place down if she is not allowed to leave.    OBJECTIVE    Temp:  [97.8 °F (36.6 °C)-99.3 °F (37.4 °C)] 98 °F (36.7 °C)  Heart Rate:  [72-82] 82  Resp:  [18] 18  BP: (102-112)/(63-68) 108/68    MENTAL STATUS EXAM:  Appearance:Casually dressed, good hygeine.   Cooperation:Cooperative  Psychomotor: No psychomotor agitation/retardation, No EPS, No motor tics  Speech-normal rate, amount.  Mood \"irritable\"   Affect-congruent, appropriate, stable  Thought Content-goal directed, no delusional material present  Thought process-linear, organized.  Suicidality: No SI  Homicidality: No HI  Perception: No AH/VH  Insight-fair   Judgement-poor    Lab Results (last 24 hours)     Procedure Component Value Units Date/Time    Hepatitis Panel, Acute [[id number]]  (Abnormal) Collected:  18 1309    Specimen:  Blood Updated:  18 1456     Hepatitis B Surface Ag Non-Reactive     Hep A IgM Non-Reactive     Hep B C IgM Non-Reactive     Hepatitis C Ab Reactive (A)    HIV-1 / O / 2 Ag / Antibody 4th Generation [[id number]]  (Normal) Collected:  18 1309    Specimen:  Blood Updated:  18 1443     HIV-1/ HIV-2 " Non-Reactive    Narrative:         A non-reactive test result does not preclude the possibility of exposure to HIV or infection with HIV. An antibody response to recent exposure may take several months to reach detectable levels.             Imaging Results (last 24 hours)     ** No results found for the last 24 hours. **             ECG/EMG Results (most recent)     None           ALLERGIES: Geodon [ziprasidone hcl] and Shellfish-derived products      Current Facility-Administered Medications:   •  aluminum-magnesium hydroxide-simethicone (MAALOX MAX) 400-400-40 MG/5ML suspension 15 mL, 15 mL, Oral, Q6H PRN, Bradly Lundberg MD  •  benzonatate (TESSALON) capsule 100 mg, 100 mg, Oral, TID PRN, Bradly Lundberg MD  •  benztropine (COGENTIN) tablet 1 mg, 1 mg, Oral, Daily PRN **OR** benztropine (COGENTIN) injection 0.5 mg, 0.5 mg, Intramuscular, Daily PRN, Bradly Lundberg MD  •  diphenhydrAMINE (BENADRYL) injection 50 mg, 50 mg, Intramuscular, Once PRN, Dru Morales MD  •  famotidine (PEPCID) tablet 20 mg, 20 mg, Oral, BID PRN, Bradly Lundberg MD  •  haloperidol lactate (HALDOL) injection 5 mg, 5 mg, Intramuscular, Once PRN, Dru Morales MD  •  hydrOXYzine (ATARAX) tablet 50 mg, 50 mg, Oral, Q6H PRN, Bradly Lundberg MD, 50 mg at 06/07/18 0727  •  ibuprofen (ADVIL,MOTRIN) tablet 600 mg, 600 mg, Oral, Q6H PRN, Bradly Lundberg MD  •  lamoTRIgine (LaMICtal) tablet 25 mg, 25 mg, Oral, Daily, Luis Winter MD  •  loperamide (IMODIUM) capsule 2 mg, 2 mg, Oral, 4x Daily PRN, Bradly Lundberg MD  •  LORazepam (ATIVAN) injection 2 mg, 2 mg, Intramuscular, Once PRN, Dru Morales MD  •  lurasidone (LATUDA) tablet 40 mg, 40 mg, Oral, Daily, Luis Winter MD  •  magnesium hydroxide (MILK OF MAGNESIA) suspension 2400 mg/10mL 10 mL, 10 mL, Oral, Daily PRN, Bradly Lundberg MD  •  nicotine (NICODERM CQ) 21 MG/24HR patch 1 patch, 1 patch, Transdermal, Daily, Bradly Landeros  MD Toño  •  OLANZapine zydis (zyPREXA) disintegrating tablet 5 mg, 5 mg, Oral, Q6H PRN, Bradly Lundberg MD  •  ondansetron (ZOFRAN) tablet 4 mg, 4 mg, Oral, Q6H PRN, Bradly Lundberg MD  •  sodium chloride (OCEAN) nasal spray 2 spray, 2 spray, Each Nare, PRN, Bradly Lundberg MD  •  traZODone (DESYREL) tablet 50 mg, 50 mg, Oral, Nightly PRN, Bradly Lundberg MD  •  venlafaxine XR (EFFEXOR-XR) 24 hr capsule 37.5 mg, 37.5 mg, Oral, Daily With Breakfast, Luis Winter MD    ASSESSMENT & PLAN:    Active Problems:    Suicidal ideation  Plan: SP3      Severe recurrent major depression with psychotic features  Plan: Effexor XR 37.5 mg daily, Latuda 40 mg daily, Lamictal 25 mg daily      PTSD (post-traumatic stress disorder)  Plan: Effexor and Latuda      Methamphetamine dependence  Plan: Substance use counseling, supportive treatment.      Cannabis dependence, continuous  Plan: Substance use counseling, supportive treatment.      Suicide precautions: Suicide precaution Level 3 (q15 min checks)     Behavioral Health Treatment Plan and Problem List: I have reviewed and approved the Behavioral Health Treatment Plan and Problem list.  The patient has had a chance to review and agrees with the treatment plan.     Clinician:  Luis Winter MD  06/07/18  12:51 PM

## 2018-06-07 NOTE — NURSING NOTE
"Patient came to nurses station asking \"when is my 72 hour hold up?\" Explained to patient that she had just gotten here this morning and it would not be up for a few days. Patient then screamed \"well that's not going to work.\" and then kicked chair over in dayroom. Staff attempted to redirect patient and security called to the unit. Patient then walked to her room and laid down in her bed. Explained to patient that kind of behavior is unacceptable. Patient would not respond to staff. Will monitor.   "

## 2018-06-07 NOTE — PLAN OF CARE
Problem: Patient Care Overview  Goal: Plan of Care Review   06/07/18 5776   Coping/Psychosocial   Plan of Care Reviewed With patient   Coping/Psychosocial   Patient Agreement with Plan of Care agrees   Plan of Care Review   Progress no change   OTHER   Outcome Summary Pt does not want to be here. Pt rates anxiety and depression 10/10. Pt denies SI/HI and SHOBHA. Pt having mild WD's and denied craving.

## 2018-06-08 VITALS
HEIGHT: 65 IN | RESPIRATION RATE: 18 BRPM | HEART RATE: 68 BPM | WEIGHT: 155 LBS | OXYGEN SATURATION: 98 % | BODY MASS INDEX: 25.83 KG/M2 | DIASTOLIC BLOOD PRESSURE: 64 MMHG | SYSTOLIC BLOOD PRESSURE: 108 MMHG | TEMPERATURE: 99.5 F

## 2018-06-08 PROBLEM — R45.851 SUICIDAL IDEATION: Status: RESOLVED | Noted: 2018-06-06 | Resolved: 2018-06-08

## 2018-06-08 PROCEDURE — 99238 HOSP IP/OBS DSCHRG MGMT 30/<: CPT | Performed by: PSYCHIATRY & NEUROLOGY

## 2018-06-08 RX ORDER — VENLAFAXINE HYDROCHLORIDE 37.5 MG/1
37.5 CAPSULE, EXTENDED RELEASE ORAL
Qty: 30 CAPSULE | Refills: 0 | Status: SHIPPED | OUTPATIENT
Start: 2018-06-09 | End: 2018-10-23

## 2018-06-08 RX ORDER — LURASIDONE HYDROCHLORIDE 40 MG/1
40 TABLET, FILM COATED ORAL DAILY
Qty: 30 TABLET | Refills: 0 | Status: SHIPPED | OUTPATIENT
Start: 2018-06-09 | End: 2018-10-23

## 2018-06-08 RX ORDER — LAMOTRIGINE 25 MG/1
25 TABLET ORAL DAILY
Qty: 30 TABLET | Refills: 0 | Status: SHIPPED | OUTPATIENT
Start: 2018-06-09 | End: 2018-10-23

## 2018-06-08 RX ADMIN — LURASIDONE HYDROCHLORIDE 40 MG: 40 TABLET, FILM COATED ORAL at 08:57

## 2018-06-08 RX ADMIN — LAMOTRIGINE 25 MG: 100 TABLET ORAL at 08:58

## 2018-06-08 RX ADMIN — VENLAFAXINE HYDROCHLORIDE 37.5 MG: 37.5 CAPSULE, EXTENDED RELEASE ORAL at 08:58

## 2018-06-08 NOTE — PLAN OF CARE
Problem: Patient Care Overview  Goal: Plan of Care Review  Outcome: Ongoing (interventions implemented as appropriate)   06/08/18 030   Coping/Psychosocial   Plan of Care Reviewed With patient   Coping/Psychosocial   Patient Agreement with Plan of Care agrees   Plan of Care Review   Progress no change   OTHER   Outcome Summary Patient continues to isolate in her room. Rates high levels of anxiety and depression. Appears to sleep well.

## 2018-06-08 NOTE — DISCHARGE SUMMARY
"      PSYCHIATRIC DISCHARGE SUMMARY     Patient Identification:  Name:  Cierra Perry  Age:  28 y.o.  Sex:  female  :  1990  MRN:  [id number]  Visit Number:  [id number]      Date of Admission:2018   Date of Discharge:  2018    Discharge Diagnosis:  Principal Problem:    Severe recurrent major depression with psychotic features  Active Problems:    PTSD (post-traumatic stress disorder)    Methamphetamine dependence    Cannabis dependence, continuous        Admission Diagnosis:  Suicidal ideation [R45.242]     Hospital Course  Patient is a 28 y.o. female presented with psychosis which appeared related to her methamphetamine use. She was very agitated, disorganized and confused in the ER and appeared to be actively hallucinating, and refused to come to the inpatient unit. She was put on a hold and admitted to the [facility] unit.   The patient at first was very non-cooperative and refused to take any medications. She needed PRN medications to help her calm down. The next day she reported feeling some better and was able to talk about her depressive symptoms and PTSD symptoms. She reported that she did better in the past on Effexor XR, Lamictal and Latuda along with Ativan 2 mg tid. She was restarted on all except Ativan as she had recently overdosed on it. She continued to do well. Had a good family visit. She also signed consent for this treatment and was no longer on a hold. She requested to be discharged, and as her psychosis had cleared and she was not voicing any thoughts of harm to self or others, she was discharged home with plan to follow-up at [facility]. She was encouraged to go to residential rehab but she was not interested and wanted to go to outpatient treatment only.    Mental Status Exam upon discharge:   Mood \"good\"   Affect-congruent, appropriate, stable  Thought Content-goal directed, no delusional material present  Thought process-linear, organized.  Suicidality: No " SI  Homicidality: No HI  Perception: No AH/VH    Procedures Performed         Consults:   Consults     No orders found from 5/8/2018 to 6/7/2018.          Pertinent Test Results:   Hep C reactive, UDS positive for amphetamine and THC. HIV negative.    Condition on Discharge:  improved    Vital Signs  Temp:  [97.6 °F (36.4 °C)-99.5 °F (37.5 °C)] 99.5 °F (37.5 °C)  Heart Rate:  [55-83] 68  Resp:  [18-20] 18  BP: ()/(58-73) 108/64      Discharge Disposition:  Home or Self Care    Discharge Medications:   Cierra Perry   Home Medication Instructions LYNDON:[id number]    Printed on:06/08/18 1142   Medication Information                      lamoTRIgine (LaMICtal) 25 MG tablet  Take 1 tablet by mouth Daily.             lurasidone (LATUDA) 40 MG tablet tablet  Take 1 tablet by mouth Daily.             venlafaxine XR (EFFEXOR-XR) 37.5 MG 24 hr capsule  Take 1 capsule by mouth Daily With Breakfast.                 Discharge Diet: Regular    Activity at Discharge: As tolerated    Follow-up Appointments    [facility].      Test Results Pending at Discharge      Clinician:   Luis Winter MD  06/08/18  11:42 AM

## 2018-06-08 NOTE — DISCHARGE INSTR - APPOINTMENTS
[place] [address]    Ph: [phone]     Appt: June 12th @ 11:00am with intake    Other Resources:     [facility]     [phone]

## 2018-06-08 NOTE — PLAN OF CARE
"Problem: Patient Care Overview  Goal: Interprofessional Rounds/Family Conf  Outcome: Ongoing (interventions implemented as appropriate)   06/08/18 1205   Interdisciplinary Rounds/Family Conf   Summary Staffed patient's case with Dr. Winter and RN.   Interdisciplinary Rounds/Family Conf   Participants social work;psychiatrist;nursing   Data:     Therapist met with patient for individual and also during Dr. Winter's assessment.  Continued to discuss progress and treatment goals.  Educated patient about importance of safety and aftercare plans.  Treatment team strongly encouraged patient to attend residential substance abuse treatment upon discharge.  Patient declined today and reports plan to follow up with [facility].  Patient discussed feeling \"like a new person\" today and much better.  She reports medications to be helpful and will continue medication regime.  Patient denied concerns and reports feeling ready to discharge today.    Assessment:    Patient is observed to display appropriate affect and \"great\" mood.  Patient adamantly denied any thoughts to harm herself or others.  She denied hallucinations.  Patient appeared alert and oriented x3.  She displayed limited insight.    Plan:    Therapist will continue to assist daily with aftercare and safety planning as needed.  Patient appears stable to discharge home with mother today.  Patient has aftercare scheduled with [facility] and was provided contact information for [facility] substance abuse rehab.      "

## 2018-06-12 NOTE — ED PROVIDER NOTES
Subjective   History of Present Illness    Review of Systems    Past Medical History:   Diagnosis Date   • Anxiety    • Depression    • PTSD (post-traumatic stress disorder)    • Substance abuse    • Suicide attempt     last one a few days ago. Overdose of benzodiazapines       Allergies   Allergen Reactions   • Geodon [Ziprasidone Hcl]      rash   • Shellfish-Derived Products Swelling       History reviewed. No pertinent surgical history.    History reviewed. No pertinent family history.    Social History     Social History   • Marital status: Single     Social History Main Topics   • Smoking status: Current Every Day Smoker     Packs/day: 0.50     Types: Cigarettes   • Smokeless tobacco: Never Used      Comment: declines   • Alcohol use No      Comment: denies    • Drug use: Yes     Types: Methamphetamines, IV, Marijuana   • Sexual activity: Yes     Birth control/ protection: None     Other Topics Concern   • Not on file           Objective   Physical Exam    Procedures  Results for orders placed or performed during the hospital encounter of 06/06/18   Comprehensive Metabolic Panel   Result Value Ref Range    Glucose 99 70 - 110 mg/dL    BUN 16 7 - 21 mg/dL    Creatinine 0.77 0.43 - 1.29 mg/dL    Sodium 136 135 - 153 mmol/L    Potassium 3.6 3.5 - 5.3 mmol/L    Chloride 107 99 - 112 mmol/L    CO2 25.7 24.3 - 31.9 mmol/L    Calcium 9.2 7.7 - 10.0 mg/dL    Total Protein 7.6 6.0 - 8.0 g/dL    Albumin 4.40 3.50 - 5.00 g/dL    ALT (SGPT) 15 10 - 36 U/L    AST (SGOT) 18 10 - 30 U/L    Alkaline Phosphatase 92 35 - 104 U/L    Total Bilirubin 0.8 0.2 - 1.8 mg/dL    eGFR Non African Amer 89 >60 mL/min/1.73    Globulin 3.2 gm/dL    A/G Ratio 1.4 (L) 1.5 - 2.5 g/dL    BUN/Creatinine Ratio 20.8 7.0 - 25.0    Anion Gap 3.3 (L) 3.6 - 11.2 mmol/L   Acetaminophen Level   Result Value Ref Range    Acetaminophen <10.0 0.0 - 200.0 mcg/mL   Salicylate Level   Result Value Ref Range    Salicylate <1.0 0.0 - 30.0 mg/dL   Pregnancy, Urine  - Urine, Clean Catch   Result Value Ref Range    HCG, Urine QL Negative Negative   Urine Drug Screen - Urine, Clean Catch   Result Value Ref Range    Amphetamine Screen, Urine Positive (A) Negative    Barbiturates Screen, Urine Negative Negative    Benzodiazepine Screen, Urine Negative Negative    Cocaine Screen, Urine Negative Negative    Methadone Screen, Urine Negative Negative    Opiate Screen Negative Negative    Phencyclidine (PCP), Urine Negative Negative    THC, Screen, Urine Positive (A) Negative    6-ACETYL MORPHINE Negative Negative    Buprenorphine, Screen, Urine Negative Negative    Oxycodone Screen, Urine Negative Negative   CBC Auto Differential   Result Value Ref Range    WBC 7.37 4.50 - 12.50 10*3/mm3    RBC 4.42 4.20 - 5.40 10*6/mm3    Hemoglobin 13.8 12.0 - 16.0 g/dL    Hematocrit 41.6 37.0 - 47.0 %    MCV 94.1 (H) 80.0 - 94.0 fL    MCH 31.2 27.0 - 33.0 pg    MCHC 33.2 33.0 - 37.0 g/dL    RDW 14.0 11.5 - 14.5 %    RDW-SD 46.1 37.0 - 54.0 fl    MPV 9.9 6.0 - 10.0 fL    Platelets 290 130 - 400 10*3/mm3    Neutrophil % 65.4 30.0 - 70.0 %    Lymphocyte % 24.7 21.0 - 51.0 %    Monocyte % 8.4 0.0 - 10.0 %    Eosinophil % 0.9 0.0 - 5.0 %    Basophil % 0.5 0.0 - 2.0 %    Immature Grans % 0.1 0.0 - 0.5 %    Neutrophils, Absolute 4.81 1.40 - 6.50 10*3/mm3    Lymphocytes, Absolute 1.82 1.00 - 3.00 10*3/mm3    Monocytes, Absolute 0.62 0.10 - 0.90 10*3/mm3    Eosinophils, Absolute 0.07 0.00 - 0.70 10*3/mm3    Basophils, Absolute 0.04 0.00 - 0.30 10*3/mm3    Immature Grans, Absolute 0.01 0.00 - 0.03 10*3/mm3   Osmolality, Calculated   Result Value Ref Range    Osmolality Calc 273.2 273.0 - 305.0 mOsm/kg   Troponin   Result Value Ref Range    Troponin I <0.006 <=0.040 ng/mL   Lipase   Result Value Ref Range    Lipase 30 13 - 60 U/L   Light Blue Top   Result Value Ref Range    Extra Tube hold for add-on    Green Top (Gel)   Result Value Ref Range    Extra Tube Hold for add-ons.    Lavender Top   Result Value Ref  Range    Extra Tube hold for add-on    Gold Top - SST   Result Value Ref Range    Extra Tube Hold for add-ons.               ED Course  ED Course as of Jun 12 0115   Wed Jun 06, 2018   0705 Normal sinus, normal axis, normal interval, normal QRS. Nonspecific st/t wave changes no STEMI criteria    [NB]   0714 No CT head is warranted at this time.  Patient is alert and oriented ×3.  She has normal neurologic exam.  [NB]   0736 Pt signed out to dr foy at shift change  [NB]      ED Course User Index  [NB] Jason Ladd MD    I assumed this patient's care from Dr. Ladd this morning at shift change.  Please refer to his documentation for further details.  Since I arrived her emergency department stay has been uneventful.  She has rested and slept in no apparent distress.  She awakens easily to voice.  Patient is admitted to psychiatry.              MDM      Final diagnoses:   Suicidal ideations             Please note that portions of this note were completed with a voice recognition program. Efforts were made to edit the dictations, but occasionally words are mistranscribed.       Richie Foy MD  06/12/18 0116

## 2018-10-19 ENCOUNTER — HOSPITAL ENCOUNTER (EMERGENCY)
Facility: HOSPITAL | Age: 28
Discharge: HOME | End: 2018-10-19
Attending: EMERGENCY MEDICINE | Admitting: EMERGENCY MEDICINE

## 2018-10-19 VITALS
RESPIRATION RATE: 17 BRPM | BODY MASS INDEX: 29.16 KG/M2 | TEMPERATURE: 97.9 F | OXYGEN SATURATION: 100 % | WEIGHT: 175 LBS | HEART RATE: 84 BPM | SYSTOLIC BLOOD PRESSURE: 117 MMHG | HEIGHT: 65 IN | DIASTOLIC BLOOD PRESSURE: 86 MMHG

## 2018-10-19 DIAGNOSIS — R56.9 SEIZURE-LIKE ACTIVITY (HCC): Primary | ICD-10-CM

## 2018-10-19 LAB
6-ACETYL MORPHINE: NEGATIVE
ALBUMIN SERPL-MCNC: 4.8 G/DL (ref 3.5–5)
ALBUMIN/GLOB SERPL: 1.7 G/DL (ref 1.5–2.5)
ALP SERPL-CCNC: 80 U/L (ref 35–104)
ALT SERPL W P-5'-P-CCNC: 20 U/L (ref 10–36)
AMPHET+METHAMPHET UR QL: NEGATIVE
ANION GAP SERPL CALCULATED.3IONS-SCNC: 8.1 MMOL/L (ref 3.6–11.2)
AST SERPL-CCNC: 21 U/L (ref 10–30)
B-HCG UR QL: NEGATIVE
BACTERIA UR QL AUTO: ABNORMAL /HPF
BARBITURATES UR QL SCN: NEGATIVE
BASOPHILS # BLD AUTO: 0.04 10*3/MM3 (ref 0–0.3)
BASOPHILS NFR BLD AUTO: 0.6 % (ref 0–2)
BENZODIAZ UR QL SCN: NEGATIVE
BILIRUB SERPL-MCNC: 0.6 MG/DL (ref 0.2–1.8)
BILIRUB UR QL STRIP: NEGATIVE
BUN BLD-MCNC: 12 MG/DL (ref 7–21)
BUN/CREAT SERPL: 18.5 (ref 7–25)
BUPRENORPHINE SERPL-MCNC: NEGATIVE NG/ML
CALCIUM SPEC-SCNC: 9.2 MG/DL (ref 7.7–10)
CANNABINOIDS SERPL QL: NEGATIVE
CHLORIDE SERPL-SCNC: 106 MMOL/L (ref 99–112)
CK SERPL-CCNC: 65 U/L (ref 24–173)
CLARITY UR: CLEAR
CO2 SERPL-SCNC: 23.9 MMOL/L (ref 24.3–31.9)
COCAINE UR QL: NEGATIVE
COLOR UR: YELLOW
CREAT BLD-MCNC: 0.65 MG/DL (ref 0.43–1.29)
DEPRECATED RDW RBC AUTO: 46 FL (ref 37–54)
EOSINOPHIL # BLD AUTO: 0.11 10*3/MM3 (ref 0–0.7)
EOSINOPHIL NFR BLD AUTO: 1.6 % (ref 0–5)
ERYTHROCYTE [DISTWIDTH] IN BLOOD BY AUTOMATED COUNT: 13.6 % (ref 11.5–14.5)
GFR SERPL CREATININE-BSD FRML MDRD: 109 ML/MIN/1.73
GLOBULIN UR ELPH-MCNC: 2.9 GM/DL
GLUCOSE BLD-MCNC: 87 MG/DL (ref 70–110)
GLUCOSE UR STRIP-MCNC: NEGATIVE MG/DL
HCT VFR BLD AUTO: 40.7 % (ref 37–47)
HGB BLD-MCNC: 13.1 G/DL (ref 12–16)
HGB UR QL STRIP.AUTO: ABNORMAL
HYALINE CASTS UR QL AUTO: ABNORMAL /LPF
IMM GRANULOCYTES # BLD: 0.02 10*3/MM3 (ref 0–0.03)
IMM GRANULOCYTES NFR BLD: 0.3 % (ref 0–0.5)
KETONES UR QL STRIP: ABNORMAL
LEUKOCYTE ESTERASE UR QL STRIP.AUTO: NEGATIVE
LYMPHOCYTES # BLD AUTO: 2.03 10*3/MM3 (ref 1–3)
LYMPHOCYTES NFR BLD AUTO: 28.6 % (ref 21–51)
MCH RBC QN AUTO: 31 PG (ref 27–33)
MCHC RBC AUTO-ENTMCNC: 32.2 G/DL (ref 33–37)
MCV RBC AUTO: 96.2 FL (ref 80–94)
METHADONE UR QL SCN: NEGATIVE
MONOCYTES # BLD AUTO: 0.57 10*3/MM3 (ref 0.1–0.9)
MONOCYTES NFR BLD AUTO: 8 % (ref 0–10)
NEUTROPHILS # BLD AUTO: 4.32 10*3/MM3 (ref 1.4–6.5)
NEUTROPHILS NFR BLD AUTO: 60.9 % (ref 30–70)
NITRITE UR QL STRIP: NEGATIVE
OPIATES UR QL: NEGATIVE
OSMOLALITY SERPL CALC.SUM OF ELEC: 274.8 MOSM/KG (ref 273–305)
OXYCODONE UR QL SCN: NEGATIVE
PCP UR QL SCN: NEGATIVE
PH UR STRIP.AUTO: 6.5 [PH] (ref 5–8)
PLATELET # BLD AUTO: 260 10*3/MM3 (ref 130–400)
PMV BLD AUTO: 10 FL (ref 6–10)
POTASSIUM BLD-SCNC: 3.9 MMOL/L (ref 3.5–5.3)
PROT SERPL-MCNC: 7.7 G/DL (ref 6–8)
PROT UR QL STRIP: NEGATIVE
RBC # BLD AUTO: 4.23 10*6/MM3 (ref 4.2–5.4)
RBC # UR: ABNORMAL /HPF
REF LAB TEST METHOD: ABNORMAL
SODIUM BLD-SCNC: 138 MMOL/L (ref 135–153)
SP GR UR STRIP: 1.03 (ref 1–1.03)
SQUAMOUS #/AREA URNS HPF: ABNORMAL /HPF
TSH SERPL DL<=0.05 MIU/L-ACNC: 0.84 MIU/ML (ref 0.55–4.78)
UROBILINOGEN UR QL STRIP: ABNORMAL
WBC NRBC COR # BLD: 7.09 10*3/MM3 (ref 4.5–12.5)
WBC UR QL AUTO: ABNORMAL /HPF

## 2018-10-19 PROCEDURE — 80307 DRUG TEST PRSMV CHEM ANLYZR: CPT | Performed by: PHYSICIAN ASSISTANT

## 2018-10-19 PROCEDURE — 81025 URINE PREGNANCY TEST: CPT | Performed by: PHYSICIAN ASSISTANT

## 2018-10-19 PROCEDURE — 81001 URINALYSIS AUTO W/SCOPE: CPT | Performed by: PHYSICIAN ASSISTANT

## 2018-10-19 PROCEDURE — 82550 ASSAY OF CK (CPK): CPT | Performed by: PHYSICIAN ASSISTANT

## 2018-10-19 PROCEDURE — 80053 COMPREHEN METABOLIC PANEL: CPT | Performed by: PHYSICIAN ASSISTANT

## 2018-10-19 PROCEDURE — 84443 ASSAY THYROID STIM HORMONE: CPT | Performed by: PHYSICIAN ASSISTANT

## 2018-10-19 PROCEDURE — 36415 COLL VENOUS BLD VENIPUNCTURE: CPT

## 2018-10-19 PROCEDURE — 99283 EMERGENCY DEPT VISIT LOW MDM: CPT

## 2018-10-19 PROCEDURE — 85025 COMPLETE CBC W/AUTO DIFF WBC: CPT | Performed by: PHYSICIAN ASSISTANT

## 2018-10-19 NOTE — ED PROVIDER NOTES
"Subjective   28-year-old female presents to ER with complaints of \"feeling weird.\"  Patient admits that her twin sister has seizures and she was concerned that she possibly was having seizures.  Past medical history is positive for illegal drug use however patient admits that she has been clean since May.  Patient admitted to have a workup at [facility] 1 week prior to arrival.  Patient admits to undergoing MRI as well as CT of her head.  Patient does admit to thyroid disease however has not been on any thyroid medication over the last 2 years.  Patient denies starting any new medications prescribed or over-the-counter.            Review of Systems   Constitutional: Negative.  Negative for fever.   HENT: Negative.    Respiratory: Negative.    Cardiovascular: Negative.  Negative for chest pain.   Gastrointestinal: Negative.  Negative for abdominal pain.   Endocrine: Negative.    Genitourinary: Negative.  Negative for dysuria.   Skin: Negative.    Neurological: Positive for light-headedness.   Psychiatric/Behavioral: Negative.    All other systems reviewed and are negative.      Past Medical History:   Diagnosis Date   • Anxiety    • Depression    • PTSD (post-traumatic stress disorder)    • Substance abuse (CMS/HCC)    • Suicide attempt (CMS/[facility])     last one a few days ago. Overdose of benzodiazapines       Allergies   Allergen Reactions   • Geodon [Ziprasidone Hcl]      rash   • Shellfish-Derived Products Swelling       History reviewed. No pertinent surgical history.    History reviewed. No pertinent family history.    Social History     Social History   • Marital status: Single     Social History Main Topics   • Smoking status: Current Every Day Smoker     Packs/day: 1.50     Types: Cigarettes   • Smokeless tobacco: Never Used      Comment: declines   • Alcohol use No      Comment: denies    • Drug use: Yes     Types: Methamphetamines, IV, Marijuana   • Sexual activity: Yes     Birth control/ protection: None "     Other Topics Concern   • Not on file           Objective   Physical Exam   Constitutional: She is oriented to person, place, and time. She appears well-developed and well-nourished. No distress.   HENT:   Head: Normocephalic and atraumatic.   Right Ear: External ear normal.   Left Ear: External ear normal.   Nose: Nose normal.   Eyes: Conjunctivae are normal.   Neck: Normal range of motion. Neck supple. No JVD present. No tracheal deviation present.   Cardiovascular: Normal rate, regular rhythm and normal heart sounds.    No murmur heard.  Pulmonary/Chest: Effort normal and breath sounds normal. No respiratory distress. She has no wheezes.   Abdominal: Soft. Bowel sounds are normal. There is no tenderness.   Musculoskeletal: Normal range of motion. She exhibits no edema or deformity.   Neurological: She is alert and oriented to person, place, and time. No cranial nerve deficit.   Skin: Skin is warm and dry. No rash noted. She is not diaphoretic. No erythema. No pallor.   Psychiatric: She has a normal mood and affect. Her behavior is normal. Thought content normal.   Nursing note and vitals reviewed.      Procedures           ED Course  ED Course as of Oct 19 1831   Fri Oct 19, 2018   1827 MRI of the brain obtained from [facility] dated October 11, 2018.  No evidence of acute intra-axial abnormality.  Mild changes of chronic right maxillary sinusitis.  [RB]   1828 CT obtained from [facility] dated 10/11/2018.  No acute intracranial process.  [RB]   1829 Patient does admit to history of 2 strokes.  However does not have a clinical diagnosis of seizures.  [RB]      ED Course User Index  [RB] Luis Armando Montes PA                  MDM  Number of Diagnoses or Management Options  Seizure-like activity (CMS/HCC): established and worsening     Amount and/or Complexity of Data Reviewed  Clinical lab tests: reviewed and ordered  Tests in the radiology section of CPT®: reviewed  Decide to obtain previous medical  records or to obtain history from someone other than the patient: yes    Risk of Complications, Morbidity, and/or Mortality  Presenting problems: moderate  Diagnostic procedures: low  Management options: low    Patient Progress  Patient progress: stable        Final diagnoses:   Seizure-like activity (CMS/[facility])            Luis Armando Montes PA  10/19/18 8378

## 2018-10-19 NOTE — DISCHARGE INSTRUCTIONS
Call one of the offices below to establish a primary care provider.  If you are unable to get an appointment and feel it is an emergency and need to be seen immediately please return to the Emergency Department.    Call one of the office below to set up a primary care provider.    Dr. Quincy Méndez                                                                                                       [address] [facility] [address]  [phone]    Dr. Floyd, Dr. ABE Adorno, Dr. NABILA Adorno ([facility])  [address] [facility] [address]  [phone]    Dr. Monsalve, Dr. Waldron, Dr. Sheppard ([facility])  [address] [facility] [address]  [phone]    Dr. Patel  [address] [facility] [address]  [phone]    Dr. Mosley, Dr. Cee, Dr. Rosario, Dr. Maloney ([facility])  [address] [place] [address] [address]  [facility] [address]  [phone]    Dr. Corazon Dong  [address] [facility] [place] [address]  [phone]    Dr. Shannan Navarro  [address]   [address]  [facility] [address]  [phone]    Dr. Méndez  [address] [facility] [address]  [phone]    Dr. Sands, Dr. Garcia  [address] [facility] [place] [address]  [phone]    Dr. Herron  [address]                                                              [address]  [facility] [address]  [phone]    Dr. Florse  [address] [facility] [address] [phone]    Dr. Shelley Mccray  [address]    [facility] [address]  [phone]

## 2018-10-23 ENCOUNTER — OFFICE VISIT (OUTPATIENT)
Dept: FAMILY MEDICINE CLINIC | Facility: CLINIC | Age: 28
End: 2018-10-23

## 2018-10-23 VITALS
TEMPERATURE: 97.9 F | OXYGEN SATURATION: 98 % | WEIGHT: 165 LBS | SYSTOLIC BLOOD PRESSURE: 126 MMHG | BODY MASS INDEX: 27.49 KG/M2 | DIASTOLIC BLOOD PRESSURE: 74 MMHG | HEART RATE: 86 BPM | HEIGHT: 65 IN

## 2018-10-23 DIAGNOSIS — G25.81 RLS (RESTLESS LEGS SYNDROME): ICD-10-CM

## 2018-10-23 DIAGNOSIS — F43.10 PTSD (POST-TRAUMATIC STRESS DISORDER): ICD-10-CM

## 2018-10-23 DIAGNOSIS — R56.9 SEIZURE-LIKE ACTIVITY (HCC): Primary | ICD-10-CM

## 2018-10-23 DIAGNOSIS — E03.9 ACQUIRED HYPOTHYROIDISM: ICD-10-CM

## 2018-10-23 DIAGNOSIS — F33.3 SEVERE RECURRENT MAJOR DEPRESSION WITH PSYCHOTIC FEATURES (HCC): ICD-10-CM

## 2018-10-23 DIAGNOSIS — B18.2 CHRONIC HEPATITIS C WITHOUT HEPATIC COMA (HCC): ICD-10-CM

## 2018-10-23 PROCEDURE — 99203 OFFICE O/P NEW LOW 30 MIN: CPT | Performed by: PHYSICIAN ASSISTANT

## 2018-10-23 PROCEDURE — 90471 IMMUNIZATION ADMIN: CPT | Performed by: PHYSICIAN ASSISTANT

## 2018-10-23 PROCEDURE — 90686 IIV4 VACC NO PRSV 0.5 ML IM: CPT | Performed by: PHYSICIAN ASSISTANT

## 2018-10-23 RX ORDER — ROPINIROLE 0.25 MG/1
0.25 TABLET, FILM COATED ORAL SEE ADMIN INSTRUCTIONS
Qty: 120 TABLET | Refills: 0 | Status: SHIPPED | OUTPATIENT
Start: 2018-10-23 | End: 2018-11-16

## 2018-10-23 NOTE — PROGRESS NOTES
Subjective   Cierra Perry is a 28 y.o. female.     Chief complaint-seizure-like activity    History of Present Illness     She is here today to establish care as a new patient.    Seizure-like activity-  She complains of several episodes of seizure-like activity.  Her friend describes observing her legs beginning to shake and then laying on the ground where she began to shake uncontrollably.  She reports first episode was in June 2018 and tongue was swelled for several hours following the episode.  She has been to the emergency room at  [facility] in [facility].  she reports that she was advised to contact neurologist in [place] for appointment but did not have his phone number so did not follow up.  Her friend reports 4 episodes of seizure-like activity within the past 2 weeks.    10/11/2018 [facility] MRI brain revealed no evidence of acute abnormality with mild changes of chronic right maxillary sinusitis    10/11/2018 x-ray chest revealed no acute cardiopulmonary process    10/11/2018 CT of head (due to right-sided mouth numbness and history of CVA) revealed no acute intracranial process    10/11/2018 EKG showed normal sinus rhythm    Leg pain-  She reports moderate crampy leg pain at night with intermittent uncontrolled jerking sensations that awaken her from sleep.  Onset several years ago.    Chronic hepatitis C-  She has history of chronic hepatitis C.  She states that she was treated with 4 weeks of Harvoni in [place], [place].  She does not have follow-up with GI specialist recently.  Stable with most recent liver enzymes normal    PTSD-stable without medication at this time.  She denies any SI or HI.    Depression-  She has a history of severe depression with psychotic features.  She reports that she has not had any hallucinations recently.  She declines referral to psychiatry at this time.    The following portions of the patient's history were reviewed and updated as appropriate:  "allergies, current medications, past family history, past medical history, past social history, past surgical history and problem list.    Review of Systems   Constitutional: Negative for activity change, appetite change and fever.   HENT: Negative for ear pain, sinus pressure and sore throat.    Eyes: Negative for pain and visual disturbance.   Respiratory: Negative for cough and chest tightness.    Cardiovascular: Negative for chest pain and palpitations.   Gastrointestinal: Negative for abdominal pain, constipation, diarrhea, nausea and vomiting.   Endocrine: Negative for polydipsia and polyuria.   Genitourinary: Negative for dysuria and frequency.   Musculoskeletal: Negative for back pain and myalgias.   Skin: Negative for color change and rash.   Allergic/Immunologic: Negative for food allergies and immunocompromised state.   Neurological: Positive for seizures. Negative for dizziness, syncope and headaches.   Hematological: Negative for adenopathy. Does not bruise/bleed easily.   Psychiatric/Behavioral: Positive for sleep disturbance. Negative for dysphoric mood, hallucinations, self-injury and suicidal ideas. The patient is not nervous/anxious.        /74   Pulse 86   Temp 97.9 °F (36.6 °C) (Oral)   Ht 165.1 cm (65\")   Wt 74.8 kg (165 lb)   LMP 10/22/2018   SpO2 98%   BMI 27.46 kg/m²     Physical Exam   Constitutional: She is oriented to person, place, and time. She appears well-developed and well-nourished.   HENT:   Head: Normocephalic and atraumatic.   Nose: Nose normal.   Mouth/Throat: Oropharynx is clear and moist.   Eyes: Pupils are equal, round, and reactive to light. Conjunctivae and EOM are normal.   Neck: Normal range of motion. Neck supple. No tracheal deviation present. No thyromegaly present.   Cardiovascular: Normal rate, regular rhythm, normal heart sounds and intact distal pulses.    No murmur heard.  Pulmonary/Chest: Effort normal and breath sounds normal. No respiratory " distress. She has no wheezes.   Abdominal: Soft. Bowel sounds are normal. There is no tenderness. There is no guarding.   Musculoskeletal: Normal range of motion. She exhibits no edema or tenderness.   Lymphadenopathy:     She has no cervical adenopathy.   Neurological: She is alert and oriented to person, place, and time.   Skin: Skin is warm and dry. No rash noted.   Multiple tattoos noted on arms   Psychiatric: She has a normal mood and affect. Her behavior is normal.   Nursing note and vitals reviewed.      Assessment/Plan     Diagnoses and all orders for this visit:    Seizure-like activity (CMS/HCC)  -     Ambulatory Referral to Neurology    RLS (restless legs syndrome)  Comments:  Start Requip.  Plan to titrate up to 1 mg daily at bedtime.  Orders:  -     rOPINIRole (REQUIP) 0.25 MG tablet; Take 1 tablet by mouth See Admin Instructions. 1 tab qhs x 3 nights then 2 tabs qhs x 3 nights then 4 tabs qhs therafter    Chronic hepatitis C without hepatic coma (CMS/HCC)  Comments:  She declines referral to hepatitis clinic at this time stating she was previously treated and is asymptomatic.    PTSD (post-traumatic stress disorder)    Severe recurrent major depression with psychotic features (CMS/HCC)  Comments:  She declines psychiatric referral at this time.    Acquired hypothyroidism  Comments:  Most recent TSH within normal limits.  Currently controlled without medication    Other orders  -     Fluarix/Fluzone/Afluria Quad>6 Months    Previous medical records reviewed from [facility]  [facility] records reviewed [facility]  Medical records reviewed from the ER visit at [facility]  Medical records have been scanned into her chart after review           This document has been electronically signed by:  Ashanti Meraz PA-C

## 2018-11-16 ENCOUNTER — OFFICE VISIT (OUTPATIENT)
Dept: FAMILY MEDICINE CLINIC | Facility: CLINIC | Age: 28
End: 2018-11-16

## 2018-11-16 VITALS
DIASTOLIC BLOOD PRESSURE: 98 MMHG | SYSTOLIC BLOOD PRESSURE: 100 MMHG | HEART RATE: 90 BPM | HEIGHT: 65 IN | WEIGHT: 172 LBS | OXYGEN SATURATION: 97 % | BODY MASS INDEX: 28.66 KG/M2 | TEMPERATURE: 98 F

## 2018-11-16 DIAGNOSIS — F33.3 SEVERE RECURRENT MAJOR DEPRESSION WITH PSYCHOTIC FEATURES (HCC): ICD-10-CM

## 2018-11-16 DIAGNOSIS — F43.10 PTSD (POST-TRAUMATIC STRESS DISORDER): ICD-10-CM

## 2018-11-16 DIAGNOSIS — B18.2 CHRONIC HEPATITIS C WITHOUT HEPATIC COMA (HCC): ICD-10-CM

## 2018-11-16 DIAGNOSIS — Z91.199 H/O NONCOMPLIANCE WITH MEDICAL TREATMENT, PRESENTING HAZARDS TO HEALTH: ICD-10-CM

## 2018-11-16 DIAGNOSIS — J45.40 MODERATE PERSISTENT ASTHMA, UNSPECIFIED WHETHER COMPLICATED: Primary | ICD-10-CM

## 2018-11-16 PROCEDURE — 99214 OFFICE O/P EST MOD 30 MIN: CPT | Performed by: PHYSICIAN ASSISTANT

## 2018-11-16 RX ORDER — ALBUTEROL SULFATE 90 UG/1
2 AEROSOL, METERED RESPIRATORY (INHALATION) EVERY 4 HOURS PRN
Qty: 1 INHALER | Refills: 5 | Status: SHIPPED | OUTPATIENT
Start: 2018-11-16

## 2018-11-16 NOTE — PROGRESS NOTES
"Subjective   Cierra Perry is a 28 y.o. female.     Chief complaint-shortness of breath    History of Present Illness     Shortness of breath-  She reports she has a history of asthma.  She states that she was told by a doctor in the past that there was a problem with her lower lungs bilaterally being \"crystallized.\"  She has not been seen by a pulmonologist in recent years.  Previous care was in [facility].  Onset greater than 5 years ago.    Seizure-like activity-  She reports that she did not keep the appointment with the neurologist yesterday stating that she has not had any more seizure-like episodes.  She reports that she does not want to have any further evaluation or treatment by a specialist for this issue.  She states that she just wants to go back to work.      Chronic hepatitis C-stable with normal liver enzymes recently    PTSD-stable without medication at this time.  She denies any SI or HI    Depression-  She reports depression has been worse since she has not been working.  She is interested in going back to work as she feels this will distract her and improve her mood.    The following portions of the patient's history were reviewed and updated as appropriate: allergies, current medications, past family history, past medical history, past social history, past surgical history and problem list.    Review of Systems   Constitutional: Negative for activity change, appetite change and fever.   HENT: Negative for ear pain, sinus pressure and sore throat.    Eyes: Negative for pain and visual disturbance.   Respiratory: Positive for shortness of breath. Negative for cough and chest tightness.    Cardiovascular: Negative for chest pain and palpitations.   Gastrointestinal: Negative for abdominal pain, constipation, diarrhea, nausea and vomiting.   Endocrine: Negative for polydipsia and polyuria.   Genitourinary: Negative for dysuria and frequency.   Musculoskeletal: Negative for back pain and myalgias. " "  Skin: Negative for color change and rash.   Allergic/Immunologic: Negative for food allergies and immunocompromised state.   Neurological: Negative for dizziness, syncope and headaches.   Hematological: Negative for adenopathy. Does not bruise/bleed easily.   Psychiatric/Behavioral: Positive for dysphoric mood. Negative for hallucinations and suicidal ideas. The patient is not nervous/anxious.        /98   Pulse 90   Temp 98 °F (36.7 °C) (Oral)   Ht 165.1 cm (65\")   Wt 78 kg (172 lb)   LMP 10/22/2018   SpO2 97%   BMI 28.62 kg/m²   Admission on 10/19/2018, Discharged on 10/19/2018   Component Date Value Ref Range Status   • Glucose 10/19/2018 87  70 - 110 mg/dL Final   • BUN 10/19/2018 12  7 - 21 mg/dL Final   • Creatinine 10/19/2018 0.65  0.43 - 1.29 mg/dL Final   • Sodium 10/19/2018 138  135 - 153 mmol/L Final   • Potassium 10/19/2018 3.9  3.5 - 5.3 mmol/L Final   • Chloride 10/19/2018 106  99 - 112 mmol/L Final   • CO2 10/19/2018 23.9* 24.3 - 31.9 mmol/L Final   • Calcium 10/19/2018 9.2  7.7 - 10.0 mg/dL Final   • Total Protein 10/19/2018 7.7  6.0 - 8.0 g/dL Final   • Albumin 10/19/2018 4.80  3.50 - 5.00 g/dL Final   • ALT (SGPT) 10/19/2018 20  10 - 36 U/L Final   • AST (SGOT) 10/19/2018 21  10 - 30 U/L Final   • Alkaline Phosphatase 10/19/2018 80  35 - 104 U/L Final   • Total Bilirubin 10/19/2018 0.6  0.2 - 1.8 mg/dL Final   • eGFR Non African Amer 10/19/2018 109  >60 mL/min/1.73 Final   • Globulin 10/19/2018 2.9  gm/dL Final   • A/G Ratio 10/19/2018 1.7  1.5 - 2.5 g/dL Final   • BUN/Creatinine Ratio 10/19/2018 18.5  7.0 - 25.0 Final   • Anion Gap 10/19/2018 8.1  3.6 - 11.2 mmol/L Final   • HCG, Urine QL 10/19/2018 Negative  Negative Final   • Color, UA 10/19/2018 Yellow  Yellow, Straw Final   • Appearance, UA 10/19/2018 Clear  Clear Final   • pH, UA 10/19/2018 6.5  5.0 - 8.0 Final   • Specific Gravity, UA 10/19/2018 1.026  1.005 - 1.030 Final   • Glucose, UA 10/19/2018 Negative  Negative Final   • " Ketones, UA 10/19/2018 Trace* Negative Final   • Bilirubin, UA 10/19/2018 Negative  Negative Final   • Blood, UA 10/19/2018 Small (1+)* Negative Final   • Protein, UA 10/19/2018 Negative  Negative Final   • Leuk Esterase, UA 10/19/2018 Negative  Negative Final   • Nitrite, UA 10/19/2018 Negative  Negative Final   • Urobilinogen, UA 10/19/2018 0.2 E.U./dL  0.2 - 1.0 E.U./dL Final   • TSH 10/19/2018 0.838  0.550 - 4.780 mIU/mL Final   • Creatine Kinase 10/19/2018 65  24 - 173 U/L Final   • Amphetamine Screen, Urine 10/19/2018 Negative  Negative Final   • Barbiturates Screen, Urine 10/19/2018 Negative  Negative Final   • Benzodiazepine Screen, Urine 10/19/2018 Negative  Negative Final   • Cocaine Screen, Urine 10/19/2018 Negative  Negative Final   • Methadone Screen, Urine 10/19/2018 Negative  Negative Final   • Opiate Screen 10/19/2018 Negative  Negative Final   • Phencyclidine (PCP), Urine 10/19/2018 Negative  Negative Final   • THC, Screen, Urine 10/19/2018 Negative  Negative Final   • 6-ACETYL MORPHINE 10/19/2018 Negative  Negative Final   • Buprenorphine, Screen, Urine 10/19/2018 Negative  Negative Final   • Oxycodone Screen, Urine 10/19/2018 Negative  Negative Final   • WBC 10/19/2018 7.09  4.50 - 12.50 10*3/mm3 Final   • RBC 10/19/2018 4.23  4.20 - 5.40 10*6/mm3 Final   • Hemoglobin 10/19/2018 13.1  12.0 - 16.0 g/dL Final   • Hematocrit 10/19/2018 40.7  37.0 - 47.0 % Final   • MCV 10/19/2018 96.2* 80.0 - 94.0 fL Final   • MCH 10/19/2018 31.0  27.0 - 33.0 pg Final   • MCHC 10/19/2018 32.2* 33.0 - 37.0 g/dL Final   • RDW 10/19/2018 13.6  11.5 - 14.5 % Final   • RDW-SD 10/19/2018 46.0  37.0 - 54.0 fl Final   • MPV 10/19/2018 10.0  6.0 - 10.0 fL Final   • Platelets 10/19/2018 260  130 - 400 10*3/mm3 Final   • Neutrophil % 10/19/2018 60.9  30.0 - 70.0 % Final   • Lymphocyte % 10/19/2018 28.6  21.0 - 51.0 % Final   • Monocyte % 10/19/2018 8.0  0.0 - 10.0 % Final   • Eosinophil % 10/19/2018 1.6  0.0 - 5.0 % Final   •  Basophil % 10/19/2018 0.6  0.0 - 2.0 % Final   • Immature Grans % 10/19/2018 0.3  0.0 - 0.5 % Final   • Neutrophils, Absolute 10/19/2018 4.32  1.40 - 6.50 10*3/mm3 Final   • Lymphocytes, Absolute 10/19/2018 2.03  1.00 - 3.00 10*3/mm3 Final   • Monocytes, Absolute 10/19/2018 0.57  0.10 - 0.90 10*3/mm3 Final   • Eosinophils, Absolute 10/19/2018 0.11  0.00 - 0.70 10*3/mm3 Final   • Basophils, Absolute 10/19/2018 0.04  0.00 - 0.30 10*3/mm3 Final   • Immature Grans, Absolute 10/19/2018 0.02  0.00 - 0.03 10*3/mm3 Final   • RBC, UA 10/19/2018 3-5* None Seen, 0-2 /HPF Final   • WBC, UA 10/19/2018 0-2  None Seen, 0-2 /HPF Final   • Bacteria, UA 10/19/2018 None Seen  None Seen /HPF Final   • Squamous Epithelial Cells, UA 10/19/2018 0-2  None Seen, 0-2 /HPF Final   • Hyaline Casts, UA 10/19/2018 None Seen  None Seen /LPF Final   • Methodology 10/19/2018 Automated Microscopy   Final   • Osmolality Calc 10/19/2018 274.8  273.0 - 305.0 mOsm/kg Final       Physical Exam   Constitutional: She is oriented to person, place, and time. She appears well-developed and well-nourished.   HENT:   Head: Normocephalic and atraumatic.   Nose: Nose normal.   Mouth/Throat: Oropharynx is clear and moist.   Eyes: Conjunctivae and EOM are normal. Pupils are equal, round, and reactive to light.   Neck: Normal range of motion. Neck supple. No tracheal deviation present. No thyromegaly present.   Cardiovascular: Normal rate, regular rhythm, normal heart sounds and intact distal pulses.   No murmur heard.  Pulmonary/Chest: Effort normal and breath sounds normal. No respiratory distress. She has no wheezes.   Abdominal: Soft. Bowel sounds are normal. There is no tenderness. There is no guarding.   Musculoskeletal: Normal range of motion. She exhibits no edema or tenderness.   Lymphadenopathy:     She has no cervical adenopathy.   Neurological: She is alert and oriented to person, place, and time.   Skin: Skin is warm and dry. No rash noted.    Psychiatric: She has a normal mood and affect. Her behavior is normal.   Nursing note and vitals reviewed.      Assessment/Plan     Diagnoses and all orders for this visit:    Moderate persistent asthma, unspecified whether complicated  -     XR Chest 2 View  -     Pulmonary Function Test  -     albuterol (PROVENTIL HFA;VENTOLIN HFA) 108 (90 Base) MCG/ACT inhaler; Inhale 2 puffs Every 4 (Four) Hours As Needed for Shortness of Air.    Chronic hepatitis C without hepatic coma (CMS/HCC)    PTSD (post-traumatic stress disorder)    Severe recurrent major depression with psychotic features (CMS/HCC)    H/O noncompliance with medical treatment, presenting hazards to health    15 minutes of total face to face 25 minute office visit today was spent discussing the importance of medical compliance with care.  She was advised that if she has seizure-like episodes that it's important to have these evaluated by a specialist.  She was advised that if seizure-like episodes recur that she should immediately discontinue her work around any moving parts as this could pose hazards to her health including getting body parts stuck in the machine.  She was advised that she should not drive should she start having any seizure-like episodes.  She was advised to contact the neurologist and reschedule her appointment to have this issue evaluated further.  She will be released to return to work without restrictions with the understanding that she has denied any recent seizure-like episodes and agreed to discontinue work and driving should symptoms recur.             This document has been electronically signed by:  Ashanti Meraz PA-C

## 2019-02-06 ENCOUNTER — OFFICE VISIT (OUTPATIENT)
Dept: FAMILY MEDICINE CLINIC | Facility: CLINIC | Age: 29
End: 2019-02-06

## 2019-02-06 VITALS
SYSTOLIC BLOOD PRESSURE: 122 MMHG | OXYGEN SATURATION: 96 % | HEART RATE: 101 BPM | HEIGHT: 65 IN | BODY MASS INDEX: 28.16 KG/M2 | DIASTOLIC BLOOD PRESSURE: 82 MMHG | WEIGHT: 169 LBS | TEMPERATURE: 98.9 F

## 2019-02-06 DIAGNOSIS — B86 SCABIES: ICD-10-CM

## 2019-02-06 DIAGNOSIS — Z72.0 TOBACCO ABUSE: Primary | ICD-10-CM

## 2019-02-06 PROCEDURE — 99213 OFFICE O/P EST LOW 20 MIN: CPT | Performed by: NURSE PRACTITIONER

## 2019-02-06 RX ORDER — VARENICLINE TARTRATE 1 MG/1
1 TABLET, FILM COATED ORAL 2 TIMES DAILY
Qty: 60 TABLET | Refills: 5 | Status: SHIPPED | OUTPATIENT
Start: 2019-02-06

## 2019-02-06 RX ORDER — VARENICLINE TARTRATE 0.5 MG/1
TABLET, FILM COATED ORAL
Qty: 11 TABLET | Refills: 0 | Status: SHIPPED | OUTPATIENT
Start: 2019-02-06

## 2019-02-06 RX ORDER — PERMETHRIN 50 MG/G
CREAM TOPICAL ONCE
Qty: 1 EACH | Refills: 0 | Status: SHIPPED | OUTPATIENT
Start: 2019-02-06 | End: 2019-02-06

## 2019-02-06 NOTE — PROGRESS NOTES
"Deniz Perry is a 28 y.o. female.     Chief Complaint   Patient presents with   • Rash       History of Present Illness     Rash-exposure from a younger cousin to scabies.  She has noted rash on her hands, BLE, and abdomen.  She is having itching \"everywhere\".    Tobacco abuse-request Chantix.  Has used before in \"[place]\".  She is presently smoking 3 packs per day.  She reports she has noted an increase in SOA>.  She will be having updated testing.     The following portions of the patient's history were reviewed and updated as appropriate: allergies, current medications, past family history, past medical history, past social history, past surgical history and problem list.    Review of Systems   Constitutional: Negative for appetite change, fatigue and unexpected weight change.   HENT: Negative for congestion, ear pain, nosebleeds, postnasal drip, rhinorrhea, sore throat, trouble swallowing and voice change.    Eyes: Negative for pain and visual disturbance.   Respiratory: Negative for cough, shortness of breath and wheezing.    Cardiovascular: Negative for chest pain and palpitations.   Gastrointestinal: Positive for constipation. Negative for abdominal pain, blood in stool and diarrhea.   Endocrine: Negative for cold intolerance and polydipsia.   Genitourinary: Negative for difficulty urinating, flank pain and hematuria.   Musculoskeletal: Negative for arthralgias, back pain, gait problem, joint swelling and myalgias.   Skin: Positive for rash. Negative for color change.   Allergic/Immunologic: Negative.    Neurological: Positive for dizziness and headaches. Negative for syncope and numbness.   Hematological: Negative.    Psychiatric/Behavioral: Negative for sleep disturbance and suicidal ideas.   All other systems reviewed and are negative.      Objective     /82   Pulse 101   Temp 98.9 °F (37.2 °C) (Temporal)   Ht 165.1 cm (65\")   Wt 76.7 kg (169 lb)   SpO2 96%   BMI 28.12 kg/m² "     Physical Exam   Constitutional: She is oriented to person, place, and time. She appears well-developed and well-nourished. No distress.   HENT:   Head: Normocephalic and atraumatic.   Right Ear: External ear normal.   Left Ear: External ear normal.   Nose: Nose normal.   Mouth/Throat: Oropharynx is clear and moist.   Eyes: EOM are normal. Pupils are equal, round, and reactive to light. No scleral icterus.   Neck: Normal range of motion. Neck supple. No JVD present. No thyromegaly present.   Cardiovascular: Normal rate, regular rhythm and normal heart sounds.   Pulmonary/Chest: Effort normal and breath sounds normal.   Abdominal: Soft. Bowel sounds are normal. There is no tenderness.   Neurological: She is alert and oriented to person, place, and time. No cranial nerve deficit. Coordination normal.   Skin: Skin is warm and dry. Rash noted.   Scabies like rash on BLE, Abdomen and hands.  5-6 lesion noted on face.   Psychiatric: She has a normal mood and affect. Her behavior is normal. Judgment and thought content normal.   Vitals reviewed.      Assessment/Plan     Problem List Items Addressed This Visit     None      Visit Diagnoses     Tobacco abuse    -  Primary    Relevant Medications    varenicline (CHANTIX) 0.5 MG tablet    varenicline (CHANTIX CONTINUING MONTH JAZMIN) 1 MG tablet    Scabies        Relevant Medications    permethrin (ELIMITE) 5 % cream    mupirocin (BACTROBAN) 2 % ointment          Patient's Body mass index is 28.12 kg/m². BMI is above normal parameters. Recommendations include: nutrition counseling.   (Normal BMI:  18.5-24.9, OW 25-29.9, Obesity 30 or greater)  Encouraged tobacco cessation.  Discussed risk of continued smoking.  Understands continued smoking can decrease overall health including respiratory and cardiac status.  Understands financial impact.  Discussed methods to quit smoking including nicotine supplements, Chantix, and zyban.  Patient opts to use Chantix method.  Patient will  follow up in one month.  Less than 3 minutes spent on smoking cessation counseling.      Understands disease processes and need for medications.  Understands reasons for urgent and emergent care.  Patient (& family) verbalized agreement for treatment plan.   Emotional support and active listening provided.  Patient provided time to verbalize feelings    Hot water wash for bed linen and clothes.  All non washable items in dryer on hot heat.    Treatment for house partner.      RTC PRN 3-5 days for worsening or non resolving symptoms            This document has been electronically signed by:  LAUREN Fong, FNP-C

## 2019-02-13 ENCOUNTER — HOSPITAL ENCOUNTER (OUTPATIENT)
Dept: RESPIRATORY THERAPY | Facility: HOSPITAL | Age: 29
Discharge: HOME | End: 2019-02-13
Admitting: PHYSICIAN ASSISTANT

## 2019-02-13 ENCOUNTER — HOSPITAL ENCOUNTER (OUTPATIENT)
Dept: GENERAL RADIOLOGY | Facility: HOSPITAL | Age: 29
Discharge: HOME | End: 2019-02-13

## 2019-02-13 DIAGNOSIS — J45.40 MODERATE PERSISTENT ASTHMA, UNSPECIFIED WHETHER COMPLICATED: ICD-10-CM

## 2019-02-13 PROCEDURE — 94060 EVALUATION OF WHEEZING: CPT

## 2019-02-13 PROCEDURE — 94060 EVALUATION OF WHEEZING: CPT | Performed by: INTERNAL MEDICINE

## 2019-02-13 PROCEDURE — 71046 X-RAY EXAM CHEST 2 VIEWS: CPT

## 2019-02-13 PROCEDURE — 71046 X-RAY EXAM CHEST 2 VIEWS: CPT | Performed by: RADIOLOGY

## 2019-02-14 ENCOUNTER — TELEPHONE (OUTPATIENT)
Dept: FAMILY MEDICINE CLINIC | Facility: CLINIC | Age: 29
End: 2019-02-14

## 2019-02-14 NOTE — TELEPHONE ENCOUNTER
---I called SHERI informed  Chest x-ray was normal      -- Message from CALEB Beebe sent at 2/14/2019 10:03 AM EST -----  Inform patient chest x-ray was normal

## 2022-11-02 ENCOUNTER — HOSPITAL ENCOUNTER (EMERGENCY)
Facility: HOSPITAL | Age: 32
Discharge: PSYCHIATRIC OR SUBSTANCE USE FACILITY | End: 2022-11-03
Attending: EMERGENCY MEDICINE | Admitting: EMERGENCY MEDICINE

## 2022-11-02 DIAGNOSIS — G40.909 RECURRENT SEIZURES: Primary | ICD-10-CM

## 2022-11-02 DIAGNOSIS — Z91.14 NONCOMPLIANCE WITH MEDICATION REGIMEN: ICD-10-CM

## 2022-11-02 PROCEDURE — 99284 EMERGENCY DEPT VISIT MOD MDM: CPT

## 2022-11-02 PROCEDURE — 93005 ELECTROCARDIOGRAM TRACING: CPT | Performed by: EMERGENCY MEDICINE

## 2022-11-03 VITALS
TEMPERATURE: 97.9 F | OXYGEN SATURATION: 96 % | RESPIRATION RATE: 18 BRPM | BODY MASS INDEX: 29.99 KG/M2 | WEIGHT: 180 LBS | SYSTOLIC BLOOD PRESSURE: 137 MMHG | HEART RATE: 97 BPM | HEIGHT: 65 IN | DIASTOLIC BLOOD PRESSURE: 90 MMHG

## 2022-11-03 LAB
ALBUMIN SERPL-MCNC: 4.5 G/DL (ref 3.5–5.2)
ALBUMIN/GLOB SERPL: 1.6 G/DL
ALP SERPL-CCNC: 112 U/L (ref 39–117)
ALT SERPL W P-5'-P-CCNC: 20 U/L (ref 1–33)
ANION GAP SERPL CALCULATED.3IONS-SCNC: 13 MMOL/L (ref 5–15)
AST SERPL-CCNC: 27 U/L (ref 1–32)
BASOPHILS # BLD AUTO: 0.02 10*3/MM3 (ref 0–0.2)
BASOPHILS NFR BLD AUTO: 0.2 % (ref 0–1.5)
BILIRUB SERPL-MCNC: 0.9 MG/DL (ref 0–1.2)
BUN SERPL-MCNC: 7 MG/DL (ref 6–20)
BUN/CREAT SERPL: 11.3 (ref 7–25)
CALCIUM SPEC-SCNC: 9.3 MG/DL (ref 8.6–10.5)
CHLORIDE SERPL-SCNC: 102 MMOL/L (ref 98–107)
CK SERPL-CCNC: 264 U/L (ref 20–180)
CO2 SERPL-SCNC: 23 MMOL/L (ref 22–29)
CREAT SERPL-MCNC: 0.62 MG/DL (ref 0.57–1)
DEPRECATED RDW RBC AUTO: 49.2 FL (ref 37–54)
EGFRCR SERPLBLD CKD-EPI 2021: 121.5 ML/MIN/1.73
EOSINOPHIL # BLD AUTO: 0.14 10*3/MM3 (ref 0–0.4)
EOSINOPHIL NFR BLD AUTO: 1.7 % (ref 0.3–6.2)
ERYTHROCYTE [DISTWIDTH] IN BLOOD BY AUTOMATED COUNT: 13.9 % (ref 12.3–15.4)
GLOBULIN UR ELPH-MCNC: 2.8 GM/DL
GLUCOSE SERPL-MCNC: 84 MG/DL (ref 65–99)
HCG INTACT+B SERPL-ACNC: <0.1 MIU/ML
HCT VFR BLD AUTO: 37.2 % (ref 34–46.6)
HGB BLD-MCNC: 12.2 G/DL (ref 12–15.9)
IMM GRANULOCYTES # BLD AUTO: 0.02 10*3/MM3 (ref 0–0.05)
IMM GRANULOCYTES NFR BLD AUTO: 0.2 % (ref 0–0.5)
LYMPHOCYTES # BLD AUTO: 2.98 10*3/MM3 (ref 0.7–3.1)
LYMPHOCYTES NFR BLD AUTO: 36.2 % (ref 19.6–45.3)
MCH RBC QN AUTO: 31.3 PG (ref 26.6–33)
MCHC RBC AUTO-ENTMCNC: 32.8 G/DL (ref 31.5–35.7)
MCV RBC AUTO: 95.4 FL (ref 79–97)
MONOCYTES # BLD AUTO: 0.89 10*3/MM3 (ref 0.1–0.9)
MONOCYTES NFR BLD AUTO: 10.8 % (ref 5–12)
NEUTROPHILS NFR BLD AUTO: 4.18 10*3/MM3 (ref 1.7–7)
NEUTROPHILS NFR BLD AUTO: 50.9 % (ref 42.7–76)
NRBC BLD AUTO-RTO: 0 /100 WBC (ref 0–0.2)
PLATELET # BLD AUTO: 330 10*3/MM3 (ref 140–450)
PMV BLD AUTO: 9.3 FL (ref 6–12)
POTASSIUM SERPL-SCNC: 3.6 MMOL/L (ref 3.5–5.2)
PROT SERPL-MCNC: 7.3 G/DL (ref 6–8.5)
RBC # BLD AUTO: 3.9 10*6/MM3 (ref 3.77–5.28)
SODIUM SERPL-SCNC: 138 MMOL/L (ref 136–145)
WBC NRBC COR # BLD: 8.23 10*3/MM3 (ref 3.4–10.8)

## 2022-11-03 PROCEDURE — 36415 COLL VENOUS BLD VENIPUNCTURE: CPT

## 2022-11-03 PROCEDURE — 84702 CHORIONIC GONADOTROPIN TEST: CPT | Performed by: EMERGENCY MEDICINE

## 2022-11-03 PROCEDURE — 82550 ASSAY OF CK (CPK): CPT | Performed by: EMERGENCY MEDICINE

## 2022-11-03 PROCEDURE — 85025 COMPLETE CBC W/AUTO DIFF WBC: CPT | Performed by: EMERGENCY MEDICINE

## 2022-11-03 PROCEDURE — 80053 COMPREHEN METABOLIC PANEL: CPT | Performed by: EMERGENCY MEDICINE

## 2022-11-03 NOTE — ED PROVIDER NOTES
[facility]    EMERGENCY DEPARTMENT ENCOUNTER      Pt Name: Cierra Perry  MRN: [id number]  YOB: 1990  Date of evaluation: 11/2/2022  Provider: Jason Ramos MD    CHIEF COMPLAINT       Chief Complaint   Patient presents with   • Seizures         HISTORY OF PRESENT ILLNESS  (Location/Symptom, Timing/Onset, Context/Setting, Quality, Duration, Modifying Factors, Severity.)   Cierra Perry is a 32 y.o. female who presents to the emergency department with reported recurrent generalized tonic-clonic seizure that occurred just prior to arrival and was witnessed and lasted for several seconds.  No obvious postictal period and patient is awake and alert and answering questions appropriately on arrival to the ED.  She has apparently had some ongoing suicidal thoughts over the past several days.  She was evaluated and medically cleared at [facility] in [place] earlier today and was subsequently transferred to the [facility].  Her work-up there did include a noncontrast head CT that was normal.  She had seizure activity upon arriving to the [facility], prompting transfer to the ED.  She has no complaints at this time and has had no recent illness.  She denies any substance use apart from marijuana.      Nursing notes were reviewed.    REVIEW OF SYSTEMS    (2-9 systems for level 4, 10 or more for level 5)   ROS:  General:  No fevers, no chills, no weakness  Cardiovascular:  No chest pain, no palpitations  Respiratory:  No shortness of breath, no cough, no wheezing  Gastrointestinal:  No pain, no nausea, no vomiting, no diarrhea  Musculoskeletal:  No muscle pain, no joint pain  Skin:  No rash  Neurologic:  No speech problems, no headache, no extremity numbness, no extremity tingling, no extremity weakness  Psychiatric:  No anxiety  Genitourinary:  No dysuria, no hematuria    Except as noted above the remainder of the review of systems was reviewed and negative.       PAST MEDICAL HISTORY     Past Medical History:    Diagnosis Date   • Anxiety    • Depression    • PTSD (post-traumatic stress disorder)    • Substance abuse (HCC)    • Suicide attempt (HCC)     last one a few days ago. Overdose of benzodiazapines         SURGICAL HISTORY     No past surgical history on file.      CURRENT MEDICATIONS     No current facility-administered medications for this encounter.    Current Outpatient Medications:   •  albuterol (PROVENTIL HFA;VENTOLIN HFA) 108 (90 Base) MCG/ACT inhaler, Inhale 2 puffs Every 4 (Four) Hours As Needed for Shortness of Air., Disp: 1 inhaler, Rfl: 5  •  mupirocin (BACTROBAN) 2 % ointment, Apply  topically to the appropriate area as directed 3 (Three) Times a Day., Disp: 30 g, Rfl: 0  •  varenicline (CHANTIX CONTINUING MONTH JAZMIN) 1 MG tablet, Take 1 tablet by mouth 2 (Two) Times a Day., Disp: 60 tablet, Rfl: 5  •  varenicline (CHANTIX) 0.5 MG tablet, Daily for 3 days then BID for 4 days, Disp: 11 tablet, Rfl: 0    ALLERGIES     Geodon [ziprasidone hcl] and Shellfish-derived products    FAMILY HISTORY       Family History   Problem Relation Age of Onset   • No Known Problems Mother    • Heart disease Father    • Hypertension Father    • Stroke Father           SOCIAL HISTORY       Social History     Socioeconomic History   • Marital status:    • Number of children: 0   • Years of education: 12   Tobacco Use   • Smoking status: Every Day     Packs/day: 3.00     Types: Cigarettes   • Smokeless tobacco: Never   • Tobacco comments:     declines   Substance and Sexual Activity   • Alcohol use: No     Comment: denies    • Drug use: No     Types: Methamphetamines, IV, Marijuana   • Sexual activity: Yes     Partners: Female     Birth control/protection: None         PHYSICAL EXAM    (up to 7 for level 4, 8 or more for level 5)     Vitals:    11/03/22 0246 11/03/22 0300 11/03/22 0315 11/03/22 0330   BP:  102/66  126/86   Pulse: 81  89    Resp:       Temp:       TempSrc:       SpO2:  97%  93%   Weight:       Height:            Physical Exam  General: Awake, alert, no acute distress.  HEENT: Conjunctivae normal.  Neck: Trachea midline.  Cardiac: Heart regular rate, rhythm, no murmurs, rubs, or gallops  Lungs: Lungs are clear to auscultation, there is no wheezing, rhonchi, or rales. There is no use of accessory muscles.  Chest wall: There is no tenderness to palpation over the chest wall or over ribs  Abdomen: Abdomen is soft, nontender, nondistended. There are no firm or pulsatile masses, no rebound rigidity or guarding.   Musculoskeletal: No deformity.  Neuro: Alert and oriented x4.  Cranial nerves II through XII are grossly intact.  There are no visual field deficits.  Cerebellar function intact with finger-to-nose and heel-to-shin testing.  Patient observed ambulating by myself and there is no evidence of ataxia or other gait abnormality.  Motor strength is intact in the face and strength is 5 out of 5 in all 4 extremities without any asymmetry.  Sensation to light touch is intact in all 4 extremities without any asymmetry.  Dermatology: Skin is warm and dry  Psych: Mentation is grossly normal, cognition is grossly normal. Affect is appropriate.        DIAGNOSTIC RESULTS     EKG: All EKGs are interpreted by the Emergency Department Physician who either signs or Co-signs this chart in the absence of a cardiologist.    ECG 12 Lead Other; e   Preliminary Result   Test Reason : SEIZURES   Blood Pressure :   */*   mmHG   Vent. Rate :  78 BPM     Atrial Rate :  78 BPM      P-R Int : 150 ms          QRS Dur :  80 ms       QT Int : 388 ms       P-R-T Axes :  65  67  29 degrees      QTc Int : 442 ms      ** Poor data quality, interpretation may be adversely affected   Normal sinus rhythm   Normal ECG   When compared with ECG of 06-JUN-2018 05:38,   Nonspecific T wave abnormality now evident in Anterior leads   Nonspecific T wave abnormality no longer evident in Lateral leads      Referred By: LINUS           Confirmed By:            RADIOLOGY:   Non-plain film images such as CT, Ultrasound and MRI are read by the radiologist. Plain radiographic images are visualized and preliminarily interpreted by the emergency physician with the below findings:      [] Radiologist's Report Reviewed:  No orders to display         ED BEDSIDE ULTRASOUND:   Performed by ED Physician - none    LABS:    I have reviewed and interpreted all of the currently available lab results from this visit (if applicable):  Results for orders placed or performed during the hospital encounter of 11/02/22   Comprehensive Metabolic Panel    Specimen: Arm, Right; Blood   Result Value Ref Range    Glucose 84 65 - 99 mg/dL    BUN 7 6 - 20 mg/dL    Creatinine 0.62 0.57 - 1.00 mg/dL    Sodium 138 136 - 145 mmol/L    Potassium 3.6 3.5 - 5.2 mmol/L    Chloride 102 98 - 107 mmol/L    CO2 23.0 22.0 - 29.0 mmol/L    Calcium 9.3 8.6 - 10.5 mg/dL    Total Protein 7.3 6.0 - 8.5 g/dL    Albumin 4.50 3.50 - 5.20 g/dL    ALT (SGPT) 20 1 - 33 U/L    AST (SGOT) 27 1 - 32 U/L    Alkaline Phosphatase 112 39 - 117 U/L    Total Bilirubin 0.9 0.0 - 1.2 mg/dL    Globulin 2.8 gm/dL    A/G Ratio 1.6 g/dL    BUN/Creatinine Ratio 11.3 7.0 - 25.0    Anion Gap 13.0 5.0 - 15.0 mmol/L    eGFR 121.5 >60.0 mL/min/1.73   CK    Specimen: Arm, Right; Blood   Result Value Ref Range    Creatine Kinase 264 (H) 20 - 180 U/L   hCG, Quantitative, Pregnancy    Specimen: Arm, Right; Blood   Result Value Ref Range    HCG Quantitative <0.10 mIU/mL   CBC Auto Differential    Specimen: Arm, Right; Blood   Result Value Ref Range    WBC 8.23 3.40 - 10.80 10*3/mm3    RBC 3.90 3.77 - 5.28 10*6/mm3    Hemoglobin 12.2 12.0 - 15.9 g/dL    Hematocrit 37.2 34.0 - 46.6 %    MCV 95.4 79.0 - 97.0 fL    MCH 31.3 26.6 - 33.0 pg    MCHC 32.8 31.5 - 35.7 g/dL    RDW 13.9 12.3 - 15.4 %    RDW-SD 49.2 37.0 - 54.0 fl    MPV 9.3 6.0 - 12.0 fL    Platelets 330 140 - 450 10*3/mm3    Neutrophil % 50.9 42.7 - 76.0 %    Lymphocyte % 36.2 19.6 -  45.3 %    Monocyte % 10.8 5.0 - 12.0 %    Eosinophil % 1.7 0.3 - 6.2 %    Basophil % 0.2 0.0 - 1.5 %    Immature Grans % 0.2 0.0 - 0.5 %    Neutrophils, Absolute 4.18 1.70 - 7.00 10*3/mm3    Lymphocytes, Absolute 2.98 0.70 - 3.10 10*3/mm3    Monocytes, Absolute 0.89 0.10 - 0.90 10*3/mm3    Eosinophils, Absolute 0.14 0.00 - 0.40 10*3/mm3    Basophils, Absolute 0.02 0.00 - 0.20 10*3/mm3    Immature Grans, Absolute 0.02 0.00 - 0.05 10*3/mm3    nRBC 0.0 0.0 - 0.2 /100 WBC   ECG 12 Lead Other; e   Result Value Ref Range    QT Interval 388 ms    QTC Interval 442 ms        All other labs were within normal range or not returned as of this dictation.      EMERGENCY DEPARTMENT COURSE and DIFFERENTIAL DIAGNOSIS/MDM:   Vitals:    Vitals:    11/03/22 0246 11/03/22 0300 11/03/22 0315 11/03/22 0330   BP:  102/66  126/86   Pulse: 81  89    Resp:       Temp:       TempSrc:       SpO2:  97%  93%   Weight:       Height:                This patient presents with recurrent seizure activity.  She admits to not taking her seizure medications for several days despite having them.  She states that she is just not felt like taking them.  She is awake, alert, and without any complaint during the duration of her stay in the ED.  She had lab work and imaging performed at [facility] earlier today that was all unremarkable.  I have obtained repeat labs to evaluate for any electrolyte derangement or evidence of rhabdomyolysis and this is negative for any acute pathology.  Patient has no fever or other concerning findings that raise concern for CNS infection or other emergent cause of seizure.  I feel that she is appropriate for discharge home with continued use of her seizure medications.    I had a discussion with the patient/family regarding diagnosis, diagnostic results, treatment plan, and medications.  The patient/family indicated understanding of these instructions.  I spent adequate time at the bedside preceding discharge  necessary to personally discuss the aftercare instructions, giving patient education, providing explanations of the results of our evaluations/findings, and my decision making to assure that the patient/family understand the plan of care.  Time was allotted to answer questions at that time and throughout the ED course.  Emphasis was placed on timely follow-up after discharge.  I also discussed the potential for the development of an acute emergent condition requiring further evaluation, admission, or even surgical intervention. I discussed that we found nothing during the visit today indicating the need for further workup, admission, or the presence of an unstable medical condition.  I encouraged the patient to return to the emergency department immediately for ANY concerns, worsening, new complaints, or if symptoms persist and unable to seek follow-up in a timely fashion.  The patient/family expressed understanding and agreement with this plan.  The patient will follow-up with their PCP in 1-2 days for reevaluation.       MEDICATIONS ADMINISTERED IN ED:  Medications - No data to display    PROCEDURES:  Procedures    CRITICAL CARE TIME    Total Critical Care time was 0 minutes, excluding separately reportable procedures.   There was a high probability of clinically significant/life threatening deterioration in the patient's condition which required my urgent intervention.      FINAL IMPRESSION      1. Recurrent seizures (HCC)    2. Noncompliance with medication regimen          DISPOSITION/PLAN     ED Disposition     ED Disposition   DC/Transfer to Behavioral Health Condition   Stable    Comment   --             PATIENT REFERRED TO:  Claribel Segura, LAUREN  [address] [address] [phone]    Schedule an appointment as soon as possible for a visit in 2 days      [facility] Emergency Department  [address] [address] [phone]    If  symptoms worsen      DISCHARGE MEDICATIONS:     Medication List      CONTINUE taking these medications    albuterol sulfate  (90 Base) MCG/ACT inhaler  Commonly known as: PROVENTIL HFA;VENTOLIN HFA;PROAIR HFA  Inhale 2 puffs Every 4 (Four) Hours As Needed for Shortness of Air.     mupirocin 2 % ointment  Commonly known as: BACTROBAN  Apply  topically to the appropriate area as directed 3 (Three) Times a Day.     * varenicline 0.5 MG tablet  Commonly known as: Chantix  Daily for 3 days then BID for 4 days     * varenicline 1 MG tablet  Commonly known as: Chantix Continuing Month Giovanni  Take 1 tablet by mouth 2 (Two) Times a Day.         * This list has 2 medication(s) that are the same as other medications prescribed for you. Read the directions carefully, and ask your doctor or other care provider to review them with you.                    Comment: Please note this report has been produced using speech recognition software.      Jason Ramos MD  Attending Emergency Physician               Jason Ramos MD  11/03/22 3701

## 2022-11-04 LAB
QT INTERVAL: 388 MS
QTC INTERVAL: 442 MS

## 2025-03-31 ENCOUNTER — HOSPITAL ENCOUNTER (INPATIENT)
Facility: HOSPITAL | Age: 35
LOS: 5 days | Discharge: HOME | End: 2025-04-05
Attending: PSYCHIATRY & NEUROLOGY | Admitting: PSYCHIATRY & NEUROLOGY
Payer: COMMERCIAL

## 2025-03-31 ENCOUNTER — HOSPITAL ENCOUNTER (EMERGENCY)
Facility: HOSPITAL | Age: 35
Discharge: PSYCHIATRIC OR SUBSTANCE USE FACILITY | DRG: 885 | End: 2025-03-31
Attending: EMERGENCY MEDICINE | Admitting: EMERGENCY MEDICINE
Payer: COMMERCIAL

## 2025-03-31 VITALS
RESPIRATION RATE: 18 BRPM | WEIGHT: 180 LBS | SYSTOLIC BLOOD PRESSURE: 140 MMHG | HEIGHT: 64 IN | BODY MASS INDEX: 30.73 KG/M2 | DIASTOLIC BLOOD PRESSURE: 98 MMHG | TEMPERATURE: 97 F | OXYGEN SATURATION: 96 % | HEART RATE: 86 BPM

## 2025-03-31 DIAGNOSIS — R45.851 SUICIDAL IDEATIONS: Primary | ICD-10-CM

## 2025-03-31 LAB
ALBUMIN SERPL-MCNC: 4.2 G/DL (ref 3.5–5.2)
ALBUMIN/GLOB SERPL: 1.5 G/DL
ALP SERPL-CCNC: 105 U/L (ref 39–117)
ALT SERPL W P-5'-P-CCNC: 15 U/L (ref 1–33)
AMPHET+METHAMPHET UR QL: NEGATIVE
AMPHETAMINES UR QL: NEGATIVE
ANION GAP SERPL CALCULATED.3IONS-SCNC: 14.5 MMOL/L (ref 5–15)
AST SERPL-CCNC: 15 U/L (ref 1–32)
BARBITURATES UR QL SCN: NEGATIVE
BASOPHILS # BLD AUTO: 0.06 10*3/MM3 (ref 0–0.2)
BASOPHILS NFR BLD AUTO: 0.6 % (ref 0–1.5)
BENZODIAZ UR QL SCN: POSITIVE
BILIRUB SERPL-MCNC: 0.2 MG/DL (ref 0–1.2)
BILIRUB UR QL STRIP: NEGATIVE
BUN SERPL-MCNC: 9 MG/DL (ref 6–20)
BUN/CREAT SERPL: 14.1 (ref 7–25)
BUPRENORPHINE SERPL-MCNC: NEGATIVE NG/ML
CALCIUM SPEC-SCNC: 9 MG/DL (ref 8.6–10.5)
CANNABINOIDS SERPL QL: POSITIVE
CHLORIDE SERPL-SCNC: 102 MMOL/L (ref 98–107)
CLARITY UR: CLEAR
CO2 SERPL-SCNC: 22.5 MMOL/L (ref 22–29)
COCAINE UR QL: NEGATIVE
COLOR UR: YELLOW
CREAT SERPL-MCNC: 0.64 MG/DL (ref 0.57–1)
DEPRECATED RDW RBC AUTO: 54.7 FL (ref 37–54)
EGFRCR SERPLBLD CKD-EPI 2021: 119.1 ML/MIN/1.73
EOSINOPHIL # BLD AUTO: 0.09 10*3/MM3 (ref 0–0.4)
EOSINOPHIL NFR BLD AUTO: 0.9 % (ref 0.3–6.2)
ERYTHROCYTE [DISTWIDTH] IN BLOOD BY AUTOMATED COUNT: 16 % (ref 12.3–15.4)
ETHANOL BLD-MCNC: <10 MG/DL (ref 0–10)
ETHANOL UR QL: <0.01 %
FENTANYL UR-MCNC: NEGATIVE NG/ML
GLOBULIN UR ELPH-MCNC: 2.8 GM/DL
GLUCOSE SERPL-MCNC: 202 MG/DL (ref 65–99)
GLUCOSE UR STRIP-MCNC: NEGATIVE MG/DL
HAV IGM SERPL QL IA: ABNORMAL
HBV CORE IGM SERPL QL IA: ABNORMAL
HBV SURFACE AG SERPL QL IA: ABNORMAL
HCG SERPL QL: NEGATIVE
HCT VFR BLD AUTO: 39.3 % (ref 34–46.6)
HCV AB SER QL: REACTIVE
HGB BLD-MCNC: 11.9 G/DL (ref 12–15.9)
HGB UR QL STRIP.AUTO: NEGATIVE
IMM GRANULOCYTES # BLD AUTO: 0.03 10*3/MM3 (ref 0–0.05)
IMM GRANULOCYTES NFR BLD AUTO: 0.3 % (ref 0–0.5)
KETONES UR QL STRIP: NEGATIVE
LEUKOCYTE ESTERASE UR QL STRIP.AUTO: NEGATIVE
LYMPHOCYTES # BLD AUTO: 1.7 10*3/MM3 (ref 0.7–3.1)
LYMPHOCYTES NFR BLD AUTO: 17.7 % (ref 19.6–45.3)
MAGNESIUM SERPL-MCNC: 2.1 MG/DL (ref 1.6–2.6)
MCH RBC QN AUTO: 28.3 PG (ref 26.6–33)
MCHC RBC AUTO-ENTMCNC: 30.3 G/DL (ref 31.5–35.7)
MCV RBC AUTO: 93.3 FL (ref 79–97)
METHADONE UR QL SCN: NEGATIVE
MONOCYTES # BLD AUTO: 0.51 10*3/MM3 (ref 0.1–0.9)
MONOCYTES NFR BLD AUTO: 5.3 % (ref 5–12)
NEUTROPHILS NFR BLD AUTO: 7.24 10*3/MM3 (ref 1.7–7)
NEUTROPHILS NFR BLD AUTO: 75.2 % (ref 42.7–76)
NITRITE UR QL STRIP: NEGATIVE
NRBC BLD AUTO-RTO: 0 /100 WBC (ref 0–0.2)
OPIATES UR QL: NEGATIVE
OXYCODONE UR QL SCN: NEGATIVE
PCP UR QL SCN: NEGATIVE
PH UR STRIP.AUTO: 6.5 [PH] (ref 5–8)
PLATELET # BLD AUTO: 418 10*3/MM3 (ref 140–450)
PMV BLD AUTO: 9.4 FL (ref 6–12)
POTASSIUM SERPL-SCNC: 3.5 MMOL/L (ref 3.5–5.2)
PROT SERPL-MCNC: 7 G/DL (ref 6–8.5)
PROT UR QL STRIP: NEGATIVE
RBC # BLD AUTO: 4.21 10*6/MM3 (ref 3.77–5.28)
SODIUM SERPL-SCNC: 139 MMOL/L (ref 136–145)
SP GR UR STRIP: 1.01 (ref 1–1.03)
TRICYCLICS UR QL SCN: NEGATIVE
UROBILINOGEN UR QL STRIP: NORMAL
WBC NRBC COR # BLD AUTO: 9.63 10*3/MM3 (ref 3.4–10.8)

## 2025-03-31 PROCEDURE — 93005 ELECTROCARDIOGRAM TRACING: CPT | Performed by: PSYCHIATRY & NEUROLOGY

## 2025-03-31 PROCEDURE — 84703 CHORIONIC GONADOTROPIN ASSAY: CPT | Performed by: EMERGENCY MEDICINE

## 2025-03-31 PROCEDURE — 81003 URINALYSIS AUTO W/O SCOPE: CPT | Performed by: EMERGENCY MEDICINE

## 2025-03-31 PROCEDURE — 83036 HEMOGLOBIN GLYCOSYLATED A1C: CPT | Performed by: PSYCHIATRY & NEUROLOGY

## 2025-03-31 PROCEDURE — 82077 ASSAY SPEC XCP UR&BREATH IA: CPT | Performed by: EMERGENCY MEDICINE

## 2025-03-31 PROCEDURE — 85025 COMPLETE CBC W/AUTO DIFF WBC: CPT | Performed by: EMERGENCY MEDICINE

## 2025-03-31 PROCEDURE — 36415 COLL VENOUS BLD VENIPUNCTURE: CPT

## 2025-03-31 PROCEDURE — 80053 COMPREHEN METABOLIC PANEL: CPT | Performed by: EMERGENCY MEDICINE

## 2025-03-31 PROCEDURE — 93010 ELECTROCARDIOGRAM REPORT: CPT | Performed by: SPECIALIST

## 2025-03-31 PROCEDURE — 83735 ASSAY OF MAGNESIUM: CPT | Performed by: EMERGENCY MEDICINE

## 2025-03-31 PROCEDURE — 80307 DRUG TEST PRSMV CHEM ANLYZR: CPT | Performed by: EMERGENCY MEDICINE

## 2025-03-31 PROCEDURE — 99285 EMERGENCY DEPT VISIT HI MDM: CPT

## 2025-03-31 PROCEDURE — 80074 ACUTE HEPATITIS PANEL: CPT | Performed by: PSYCHIATRY & NEUROLOGY

## 2025-03-31 RX ORDER — DESVENLAFAXINE 50 MG/1
100 TABLET, FILM COATED, EXTENDED RELEASE ORAL DAILY
Status: CANCELLED | OUTPATIENT
Start: 2025-03-31

## 2025-03-31 RX ORDER — LORAZEPAM 2 MG/1
2 TABLET ORAL ONCE
Status: COMPLETED | OUTPATIENT
Start: 2025-03-31 | End: 2025-03-31

## 2025-03-31 RX ORDER — NICOTINE 21 MG/24HR
1 PATCH, TRANSDERMAL 24 HOURS TRANSDERMAL
Status: DISCONTINUED | OUTPATIENT
Start: 2025-03-31 | End: 2025-04-05 | Stop reason: HOSPADM

## 2025-03-31 RX ORDER — BREXPIPRAZOLE 3 MG/1
3 TABLET ORAL DAILY
COMMUNITY
End: 2025-04-05 | Stop reason: HOSPADM

## 2025-03-31 RX ORDER — ALUMINA, MAGNESIA, AND SIMETHICONE 2400; 2400; 240 MG/30ML; MG/30ML; MG/30ML
15 SUSPENSION ORAL EVERY 6 HOURS PRN
Status: DISCONTINUED | OUTPATIENT
Start: 2025-03-31 | End: 2025-04-05 | Stop reason: HOSPADM

## 2025-03-31 RX ORDER — DESVENLAFAXINE 100 MG/1
100 TABLET, EXTENDED RELEASE ORAL DAILY
COMMUNITY
End: 2025-04-05 | Stop reason: HOSPADM

## 2025-03-31 RX ORDER — BENZTROPINE MESYLATE 1 MG/ML
1 INJECTION, SOLUTION INTRAMUSCULAR; INTRAVENOUS ONCE AS NEEDED
Status: DISCONTINUED | OUTPATIENT
Start: 2025-03-31 | End: 2025-04-05 | Stop reason: HOSPADM

## 2025-03-31 RX ORDER — LAMOTRIGINE 200 MG/1
200 TABLET ORAL 2 TIMES DAILY
COMMUNITY
End: 2025-04-05 | Stop reason: HOSPADM

## 2025-03-31 RX ORDER — ACETAMINOPHEN 325 MG/1
650 TABLET ORAL EVERY 6 HOURS PRN
Status: DISCONTINUED | OUTPATIENT
Start: 2025-03-31 | End: 2025-04-05 | Stop reason: HOSPADM

## 2025-03-31 RX ORDER — CLONIDINE HYDROCHLORIDE 0.1 MG/1
0.1 TABLET ORAL 3 TIMES DAILY PRN
Status: DISCONTINUED | OUTPATIENT
Start: 2025-03-31 | End: 2025-04-01

## 2025-03-31 RX ORDER — BENZONATATE 100 MG/1
100 CAPSULE ORAL 3 TIMES DAILY PRN
Status: DISCONTINUED | OUTPATIENT
Start: 2025-03-31 | End: 2025-04-05 | Stop reason: HOSPADM

## 2025-03-31 RX ORDER — BENZTROPINE MESYLATE 1 MG/1
2 TABLET ORAL ONCE AS NEEDED
Status: DISCONTINUED | OUTPATIENT
Start: 2025-03-31 | End: 2025-04-05 | Stop reason: HOSPADM

## 2025-03-31 RX ORDER — IBUPROFEN 400 MG/1
400 TABLET, FILM COATED ORAL EVERY 6 HOURS PRN
Status: DISCONTINUED | OUTPATIENT
Start: 2025-03-31 | End: 2025-04-05 | Stop reason: HOSPADM

## 2025-03-31 RX ORDER — LAMOTRIGINE 100 MG/1
200 TABLET ORAL 2 TIMES DAILY
Status: CANCELLED | OUTPATIENT
Start: 2025-03-31

## 2025-03-31 RX ORDER — ESCITALOPRAM OXALATE 10 MG/1
20 TABLET ORAL DAILY
Status: CANCELLED | OUTPATIENT
Start: 2025-03-31

## 2025-03-31 RX ORDER — ESCITALOPRAM OXALATE 20 MG/1
20 TABLET ORAL DAILY
COMMUNITY
End: 2025-04-05 | Stop reason: HOSPADM

## 2025-03-31 RX ORDER — LOPERAMIDE HYDROCHLORIDE 2 MG/1
2 CAPSULE ORAL
Status: DISCONTINUED | OUTPATIENT
Start: 2025-03-31 | End: 2025-04-05 | Stop reason: HOSPADM

## 2025-03-31 RX ORDER — TRAZODONE HYDROCHLORIDE 50 MG/1
50 TABLET ORAL NIGHTLY PRN
Status: DISCONTINUED | OUTPATIENT
Start: 2025-03-31 | End: 2025-04-05 | Stop reason: HOSPADM

## 2025-03-31 RX ORDER — LORAZEPAM 1 MG/1
1 TABLET ORAL 2 TIMES DAILY PRN
COMMUNITY
End: 2025-04-05 | Stop reason: HOSPADM

## 2025-03-31 RX ORDER — LORAZEPAM 1 MG/1
1 TABLET ORAL 2 TIMES DAILY PRN
Status: CANCELLED | OUTPATIENT
Start: 2025-03-31

## 2025-03-31 RX ORDER — ECHINACEA PURPUREA EXTRACT 125 MG
2 TABLET ORAL AS NEEDED
Status: DISCONTINUED | OUTPATIENT
Start: 2025-03-31 | End: 2025-04-05 | Stop reason: HOSPADM

## 2025-03-31 RX ORDER — POLYETHYLENE GLYCOL 3350 17 G/17G
17 POWDER, FOR SOLUTION ORAL DAILY PRN
Status: DISCONTINUED | OUTPATIENT
Start: 2025-03-31 | End: 2025-04-05 | Stop reason: HOSPADM

## 2025-03-31 RX ORDER — ALBUTEROL SULFATE 90 UG/1
2 INHALANT RESPIRATORY (INHALATION) EVERY 4 HOURS PRN
Status: DISCONTINUED | OUTPATIENT
Start: 2025-03-31 | End: 2025-04-05 | Stop reason: HOSPADM

## 2025-03-31 RX ORDER — FAMOTIDINE 20 MG/1
20 TABLET, FILM COATED ORAL 2 TIMES DAILY PRN
Status: DISCONTINUED | OUTPATIENT
Start: 2025-03-31 | End: 2025-04-05 | Stop reason: HOSPADM

## 2025-03-31 RX ORDER — ONDANSETRON 4 MG/1
4 TABLET, ORALLY DISINTEGRATING ORAL EVERY 6 HOURS PRN
Status: DISCONTINUED | OUTPATIENT
Start: 2025-03-31 | End: 2025-04-05 | Stop reason: HOSPADM

## 2025-03-31 RX ADMIN — NICOTINE 1 PATCH: 21 PATCH TRANSDERMAL at 15:52

## 2025-03-31 RX ADMIN — LORAZEPAM 2 MG: 2 TABLET ORAL at 14:04

## 2025-03-31 RX ADMIN — CLONIDINE HYDROCHLORIDE 0.1 MG: 0.1 TABLET ORAL at 16:41

## 2025-03-31 NOTE — NURSING NOTE
PATIENTS BP WAS ELEVATED WHEN SHE ARRIVED TO THE FLOOR, SEE CHART. PATIENTS BP WAS ALSO ELEVATED IN THE ER AND SHE RECEIVED 2MG OF ATIVAN. DR. BALL WAS NOTIFIED AND NEW ORDERS NOTED. CLONIDINE 0.1 TID PRN HTN   Wound Care: Vaseline

## 2025-03-31 NOTE — ED PROVIDER NOTES
Subjective     History provided by:  Patient  Mental Health Problem  Presenting symptoms: depression and suicidal thoughts    Degree of incapacity (severity):  Moderate  Onset quality:  Sudden  Duration:  1 day  Timing:  Constant  Progression:  Worsening  Chronicity:  New  Context: stressful life event    Treatment compliance:  Some of the time  Relieved by:  Nothing  Associated symptoms: anxiety, feelings of worthlessness and irritability    Associated symptoms: no abdominal pain and no chest pain    Risk factors: hx of mental illness        Review of Systems   Constitutional:  Positive for irritability. Negative for fever.   HENT: Negative.     Respiratory: Negative.     Cardiovascular: Negative.  Negative for chest pain.   Gastrointestinal: Negative.  Negative for abdominal pain.   Endocrine: Negative.    Genitourinary: Negative.  Negative for dysuria.   Skin: Negative.    Neurological: Negative.    Psychiatric/Behavioral:  Positive for suicidal ideas. The patient is nervous/anxious.    All other systems reviewed and are negative.      Past Medical History:   Diagnosis Date    Anxiety     Depression     PTSD (post-traumatic stress disorder)     Substance abuse     Suicide attempt     last one a few days ago. Overdose of benzodiazapines       Allergies   Allergen Reactions    Geodon [Ziprasidone Hcl]      rash    Shellfish-Derived Products Swelling       History reviewed. No pertinent surgical history.    Family History   Problem Relation Age of Onset    No Known Problems Mother     Heart disease Father     Hypertension Father     Stroke Father        Social History     Socioeconomic History    Marital status:     Number of children: 0    Years of education: 12   Tobacco Use    Smoking status: Every Day     Current packs/day: 1.00     Types: Cigarettes    Smokeless tobacco: Never    Tobacco comments:     declines   Substance and Sexual Activity    Alcohol use: No     Comment: denies     Drug use: No      Types: Methamphetamines, IV, Marijuana    Sexual activity: Yes     Partners: Female     Birth control/protection: None           Objective   Physical Exam  Vitals and nursing note reviewed.   Constitutional:       General: She is not in acute distress.     Appearance: She is well-developed. She is not diaphoretic.   HENT:      Head: Normocephalic and atraumatic.      Right Ear: External ear normal.      Left Ear: External ear normal.      Nose: Nose normal.   Eyes:      Conjunctiva/sclera: Conjunctivae normal.   Neck:      Vascular: No JVD.      Trachea: No tracheal deviation.   Cardiovascular:      Rate and Rhythm: Normal rate.      Heart sounds: No murmur heard.  Pulmonary:      Effort: Pulmonary effort is normal. No respiratory distress.      Breath sounds: No wheezing.   Abdominal:      Palpations: Abdomen is soft.      Tenderness: There is no abdominal tenderness.   Musculoskeletal:         General: No deformity. Normal range of motion.      Cervical back: Normal range of motion and neck supple.   Skin:     General: Skin is warm and dry.      Coloration: Skin is not pale.      Findings: No erythema or rash.   Neurological:      Mental Status: She is alert and oriented to person, place, and time.      Cranial Nerves: No cranial nerve deficit.   Psychiatric:      Comments: Verbalized suicidal thoughts.          Procedures           ED Course  ED Course as of 03/31/25 1439   Mon Mar 31, 2025   1437 After evaluation pt will be admitted for inpatient behavioral health.  [RB]      ED Course User Index  [RB] Luis Armando Montes II, PA                                                       Medical Decision Making  Amount and/or Complexity of Data Reviewed  Labs: ordered.    Risk  Prescription drug management.        Final diagnoses:   Suicidal ideations       ED Disposition  ED Disposition       ED Disposition   DC/Transfer to Behavioral Health    Condition   Stable    Comment   --               No follow-up provider  specified.       Medication List      No changes were made to your prescriptions during this visit.            Luis Armando Montes II, PA  03/31/25 [id number]

## 2025-03-31 NOTE — NURSING NOTE
"Patient presents reporting suicidal ideation with a specific plan but will not disclose her plan. She denies hi and avh. Patient reports she was at a facility and left on Friday. She reports she has not had any of her psych medications since the 4th of this month. Patient states, \"I'm just having a mental breakdown.\" Patient is very tearful and restless during assessment. She denies any substance use. She rates anxiety and depression 10/10. Patient reports she has court on Thursday and is concerned she won't be out of here in time.   "

## 2025-04-01 LAB
HBA1C MFR BLD: 5.4 % (ref 4.8–5.6)
QT INTERVAL: 370 MS
QTC INTERVAL: 429 MS

## 2025-04-01 PROCEDURE — 99223 1ST HOSP IP/OBS HIGH 75: CPT | Performed by: PSYCHIATRY & NEUROLOGY

## 2025-04-01 RX ORDER — LAMOTRIGINE 100 MG/1
50 TABLET ORAL 2 TIMES DAILY
Status: DISCONTINUED | OUTPATIENT
Start: 2025-04-01 | End: 2025-04-04

## 2025-04-01 RX ORDER — DESVENLAFAXINE 50 MG/1
100 TABLET, FILM COATED, EXTENDED RELEASE ORAL DAILY
Status: DISCONTINUED | OUTPATIENT
Start: 2025-04-01 | End: 2025-04-01

## 2025-04-01 RX ORDER — LAMOTRIGINE 100 MG/1
200 TABLET ORAL 2 TIMES DAILY
Status: DISCONTINUED | OUTPATIENT
Start: 2025-04-01 | End: 2025-04-01

## 2025-04-01 RX ORDER — DESVENLAFAXINE 50 MG/1
50 TABLET, FILM COATED, EXTENDED RELEASE ORAL DAILY
Status: DISCONTINUED | OUTPATIENT
Start: 2025-04-01 | End: 2025-04-05 | Stop reason: HOSPADM

## 2025-04-01 RX ORDER — HYDRALAZINE HYDROCHLORIDE 25 MG/1
25 TABLET, FILM COATED ORAL 3 TIMES DAILY PRN
Status: DISCONTINUED | OUTPATIENT
Start: 2025-04-01 | End: 2025-04-05 | Stop reason: HOSPADM

## 2025-04-01 RX ADMIN — LAMOTRIGINE 50 MG: 100 TABLET ORAL at 20:46

## 2025-04-01 RX ADMIN — TRAZODONE HYDROCHLORIDE 50 MG: 50 TABLET ORAL at 20:46

## 2025-04-01 RX ADMIN — DESVENLAFAXINE 50 MG: 50 TABLET, FILM COATED, EXTENDED RELEASE ORAL at 10:20

## 2025-04-01 RX ADMIN — NICOTINE 1 PATCH: 21 PATCH TRANSDERMAL at 10:20

## 2025-04-01 RX ADMIN — LAMOTRIGINE 50 MG: 100 TABLET ORAL at 10:20

## 2025-04-01 NOTE — PLAN OF CARE
Goal Outcome Evaluation:           Progress: improving  Outcome Evaluation: Therapist met with patien to review care plan, social history, aftercare recommendation, and disposition plan.           Problem: Adult Behavioral Health Plan of Care  Goal: Plan of Care Review  Outcome: Progressing  Flowsheets  Taken 4/1/2025 1018 by Vipul Reis  Progress: improving  Outcome Evaluation: Therapist met with patien to review care plan, social history, aftercare recommendation, and disposition plan.  Taken 4/1/2025 0816 by Julito Castellanos RN  Patient Agreement with Plan of Care: agrees  Taken 4/1/2025 0227 by Reno Pete RN  Plan of Care Reviewed With: patient  Goal: Patient-Specific Goal (Individualization)  Outcome: Progressing  Flowsheets  Taken 4/1/2025 1018  Patient/Family-Specific Goals (Include Timeframe): Identify 2-3 coping skills, address relapse prevention methods, complete aftercare plan, complete safety plan, and deny SI/HI within 1-7 days.  Individualized Care Needs: Therapist to offer 1-4 therapy sessions, aftercare planning, safety planning, daily groups, and brief CBT/MI interventions.  Anxieties, Fears or Concerns: None stated  Taken 4/1/2025 1009  Patient Personal Strengths:   resilient   resourceful   self-reliant  Patient Vulnerabilities:   history of unsuccessful treatment   lacks insight into illness   poor impulse control  Goal: Optimized Coping Skills in Response to Life Stressors  Outcome: Progressing  Intervention: Promote Effective Coping Strategies  Flowsheets (Taken 4/1/2025 1018)  Supportive Measures:   active listening utilized   counseling provided   decision-making supported   positive reinforcement provided   mindfulness techniques promoted   self-reflection promoted   self-responsibility promoted   verbalization of feelings encouraged  Goal: Develops/Participates in Therapeutic [place] to Support Successful Transition  Outcome: Progressing  Intervention: Foster Therapeutic  [place]  Flowsheets (Taken 4/1/2025 1018)  Trust Relationship/Rapport:   care explained   choices provided   reassurance provided   thoughts/feelings acknowledged   emotional support provided   empathic listening provided   questions answered   questions encouraged  Intervention: Mutually Develop Transition Plan  Flowsheets  Taken 4/1/2025 1018 by Vipul Reis  Outpatient/Agency/Support Group Needs:   outpatient counseling   outpatient medication management  Discharge Coordination/Progress:   Therapist met with patient to complete discharge needs assessment   patient considering outpatient services.  Transition Support:   crisis management plan promoted   crisis management plan verbalized   follow-up care discussed  Anticipated Discharge Disposition: home with family  Current Discharge Risk: substance use/abuse  Concerns to be Addressed:   mental health   medication  Readmission Within the Last 30 Days: no previous admission in last 30 days  Taken 3/31/2025 1537 by Julito Castellanos, RN  Transportation Anticipated: family or friend will provide  Patient/Family Anticipated Services at Transition: mental health services  Patient/Family Anticipates Transition to: home with family     DATA:  Therapist met individually with Patient this date for initial evaluation.  Introduced self as Therapist and the role of a positive therapeutic relationship; Patient agreeable.      Therapist encouraged Patient to speak openly and honestly about any issues or stressors during treatment stay. Therapist explained how open communication is significant to providing most effective care.      Therapist completed psychosocial assessment, integrated summary, reviewed care plans, disposition planning and discussed hospitalization expectations and treatment goals this date.     Therapist provided education regarding different levels of care and is recommending outpatient therapy and medication management for most appropriate aftercare.  "Patient agreeable. Patient signed consent for [facility].     Therapist is recommending family involvement prior to discharge and it's importance. Patient denies.      CLINICAL MANUVERING/INTERVENTIONS:  Assisted Patient in processing session content; acknowledged and normalized Patient’s thoughts, feelings, and concerns by utilizing a person-centered approach in efforts to build appropriate rapport and a positive therapeutic relationship with open and honest communication. Allowed Patient to ventilate regarding current stressors and triggers for negative emotions and thoughts in a safe nonjudgmental environment with unconditional positive regard, active listening skills, and empathy.      ASSESSMENT: Patient is a 34 year old female who presented to the ED with SI. Patient lives with her mother in her mother's home. Patient has one prior admission in 2018.      Patient was seen 1:1 bedside. Patient was irritable in affect and declined to speak in office. Patient pulled the blanket up to her face and rolled over in the bed while speaking with the therapist. Patient reports that she is just experiencing a lot of depression and \"going through BPD and bipolar shit.\" Patient declined to further explain what this meant. Therapist asked patient about her family life and she states that they are arguing right now and she does not want to talk about it. Therapist asked about a family contact such as he mother as she lives there, patient refused. Patient asked if she could go back to bed and refused to talk further. Patient denies SI/HI/AVH.     PLAN: Patient will receive 24/7 nursing monitoring and daily psychiatrist evaluation by a multidisciplinary team.    Patient will continue stabilization at this time.     Patient is agreeable for outpatient services with [facility].     "

## 2025-04-01 NOTE — PLAN OF CARE
Problem: Adult Behavioral Health Plan of Care  Goal: Plan of Care Review  Outcome: Progressing  Flowsheets  Taken 4/1/2025 1026 by Julito Castellanos, RN  Outcome Evaluation: PATIENT VERBALIZES SEVERE ANXIETY AND DEPRESSION AND A DESIRE TO NO LONGER LIVE. PATIENT DOES DENY ANY ACTIVE SI AT THIS TIME BUT STATES THAT SHE WISHES TO DIE AT TIMES. PATIENT IS AOX3, COOPERATIVE WITH NO S/S OF ACUTE DISTRESS NOTED. PATIENT IS REMAINING IN HER ROOM, SO FAR, THIS SHIFT. NEW ORDERS: A1C, APRESOLINE 25MG, PRISTIQ INCREASED TO 100MG, LAMICTAL INCREASED TO 200MG. D/C 0.1 CLONIDINE TID PRN  Taken 4/1/2025 1018 by Vipul Reis  Progress: improving  Taken 4/1/2025 0816 by Julito Castellanos, RN  Patient Agreement with Plan of Care: agrees  Taken 4/1/2025 0227 by Reno Pete RN  Plan of Care Reviewed With: patient   Goal Outcome Evaluation:  Plan of Care Reviewed With: patient  Patient Agreement with Plan of Care: agrees        Outcome Evaluation: PATIENT VERBALIZES SEVERE ANXIETY AND DEPRESSION AND A DESIRE TO NO LONGER LIVE. PATIENT DOES DENY ANY ACTIVE SI AT THIS TIME BUT STATES THAT SHE WISHES TO DIE AT TIMES. PATIENT IS AOX3, COOPERATIVE WITH NO S/S OF ACUTE DISTRESS NOTED. PATIENT IS REMAINING IN HER ROOM, SO FAR, THIS SHIFT. NEW ORDERS: A1C, APRESOLINE 25MG, PRISTIQ INCREASED TO 100MG, LAMICTAL INCREASED TO 200MG. D/C 0.1 CLONIDINE TID PRN

## 2025-04-01 NOTE — H&P
"      INITIAL PSYCHIATRIC HISTORY & PHYSICAL    Patient Identification:  Name:  Cierra Perry  Age:  34 y.o.  Sex:  female  :  1990  MRN:  [id number]   Visit Number:  [id number]  Primary Care Physician:  Kay Michel DO    SUBJECTIVE    CC/Focus of Exam: SI    HPI: Cierra Perry is a 34 y.o. female who was admitted on 3/31/2025 with complaints of SI worsening over the last month.  Patient has been without psychotropic medications since 3/4/2025.    Patient reports worsening depression, with symptoms of low mood, low energy, low motivation, poor concentration, high anxiety, anhedonia, hopelessness, worthlessness, insomnia, and SI.  Symptoms are severe, persistent, present in multiple settings, worse in the last month, worse by interpersonal stressors, improved by nothing.    Patient continues to report that her \"mind is not right.\"  She reports feeling overwhelmed, scattered, with racing thoughts, tangentiality, emotional lability, increasing SI.    PAST PSYCHIATRIC HX:  Dx: PTSD, BPD, bipolar  IP: Multiple previous hospitalizations, including [facility], [facility], 1 previous hospitalization for 3 days here in 2018  OP: [facility] in [place]  Current meds: Most recently Pristiq, Rexulti, Lexapro, Lamictal, albuterol.  She has been off psychotropics since 3/4/2025  Previous meds: Latuda, Effexor, Depakote, others that she cannot recall  SH/SI/SA: Denied/intermittent/previous aborted attempts  Trauma: Significant history of trauma, including witnessing traumatic deaths    SUBSTANCE USE HX:  Patient had been sober from methamphetamine for 5 years until relapse on 3/4/2025.  She denies use since that time.  She reports weekly use of THC of roughly 1/4 g which she obtains from a dispensary in [place].  She denies use of alcohol or illicit drugs otherwise.  Admission UDS + benzo, THC    SOCIAL HX:  Lives in [place] with wife, who is an RN.  Moved back to [place] from [place] roughly 7 years ago  Unemployed  Legal: " Court date on Thursday    FAMILY HX:    Family History   Problem Relation Age of Onset    No Known Problems Mother     Heart disease Father     Hypertension Father     Stroke Father        Past Medical History:   Diagnosis Date    Anxiety     Depression     PTSD (post-traumatic stress disorder)     Substance abuse     Suicide attempt     last one a few days ago. Overdose of benzodiazapines       History reviewed. No pertinent surgical history.    Medications Prior to Admission   Medication Sig Dispense Refill Last Dose/Taking    albuterol (PROVENTIL HFA;VENTOLIN HFA) 108 (90 Base) MCG/ACT inhaler Inhale 2 puffs Every 4 (Four) Hours As Needed for Shortness of Air. 1 inhaler 5 Past Week    Brexpiprazole (Rexulti) 3 MG tablet Take 1 tablet by mouth Daily.   Past Week    desvenlafaxine (PRISTIQ) 100 MG 24 hr tablet Take 1 tablet by mouth Daily.   Past Week    escitalopram (LEXAPRO) 20 MG tablet Take 1 tablet by mouth Daily.   Past Week    lamoTRIgine (LaMICtal) 200 MG tablet Take 1 tablet by mouth 2 (Two) Times a Day.   Past Week    LORazepam (ATIVAN) 1 MG tablet Take 1 tablet by mouth 2 (Two) Times a Day As Needed for Anxiety.   Past Week         ALLERGIES:  Hydroxyzine, Geodon [ziprasidone hcl], and Shellfish-derived products    Temp:  [97 °F (36.1 °C)-98.4 °F (36.9 °C)] 97.3 °F (36.3 °C)  Heart Rate:  [74-89] 76  Resp:  [16-18] 16  BP: (125-168)/() 125/83    REVIEW OF SYSTEMS:  Review of Systems   Psychiatric/Behavioral:  Positive for dysphoric mood, sleep disturbance and suicidal ideas. The patient is nervous/anxious.    All other systems reviewed and are negative.       OBJECTIVE    PHYSICAL EXAM:  Physical Exam  Vitals and nursing note reviewed.   Constitutional:       Appearance: She is well-developed.   HENT:      Head: Normocephalic and atraumatic.      Right Ear: External ear normal.      Left Ear: External ear normal.      Nose: Nose normal.   Eyes:      Pupils: Pupils are equal, round, and reactive to  light.   Pulmonary:      Effort: Pulmonary effort is normal. No respiratory distress.      Breath sounds: Normal breath sounds.   Abdominal:      General: There is no distension.      Palpations: Abdomen is soft.   Musculoskeletal:         General: No deformity. Normal range of motion.      Cervical back: Normal range of motion and neck supple.   Skin:     General: Skin is warm.      Findings: No rash.   Neurological:      Mental Status: She is alert and oriented to person, place, and time.      Coordination: Coordination normal.       Cranial Nerves: I. No anosmia. II: No visual disturbance. III, IV VI: EOMI, PERRLA. V: Corneal reflext intact, no abnormal sensations. VII: No facial palsy, or altered sensation. VIII: Hearing intact, balance intact. IX: Intact ah reflex. X: Normal phonation, swallowing. XI: Normal shrug and head movement. XII: Intact tongue movements      MENTAL STATUS EXAM:   Hygiene:   fair  Cooperation:  Guarded  Eye Contact:  Closed  Psychomotor Behavior:  Restless  Affect:  Restricted  Hopelessness: 7  Speech:  Normal  Thought Process: Goal directed and Linear  Thought Content:  Normal  Suicidal:  Suicidal Ideation and Death wish  Homicidal:  None  Hallucinations:  None  Delusion:  None  Memory:  Intact  Orientation:  Person, Place, Time, and Situation  Reliability:  fair  Insight:  Fair  Judgment:  Fair  Impulse Control:  Fair      Imaging Results (Last 24 Hours)       ** No results found for the last 24 hours. **             Lab Results   Component Value Date    GLUCOSE 202 (H) 03/31/2025    BUN 9 03/31/2025    CREATININE 0.64 03/31/2025    EGFRIFNONA 109 10/19/2018    BCR 14.1 03/31/2025    CO2 22.5 03/31/2025    CALCIUM 9.0 03/31/2025    ALBUMIN 4.2 03/31/2025    LABIL2 1.7 11/11/2022    AST 15 03/31/2025    ALT 15 03/31/2025       Lab Results   Component Value Date    WBC 9.63 03/31/2025    HGB 11.9 (L) 03/31/2025    HCT 39.3 03/31/2025    MCV 93.3 03/31/2025     03/31/2025        ECG/EMG Results (most recent)       Procedure Component Value Units Date/Time    ECG 12 Lead Other; Baseline Cardiac Status [[id number]] Collected: 03/31/25 1546     Updated: 03/31/25 1551     QT Interval 370 ms      QTC Interval 429 ms     Narrative:      Test Reason : Other~  Blood Pressure :   */*   mmHG  Vent. Rate :  81 BPM     Atrial Rate :  81 BPM     P-R Int : 136 ms          QRS Dur :  76 ms      QT Int : 370 ms       P-R-T Axes :  18  69  27 degrees    QTcB Int : 429 ms    Normal sinus rhythm  Normal ECG  When compared with ECG of 02-Nov-2022 23:59,  No significant change was found    Referred By: QUINN           Confirmed By:              Brief Urine Lab Results  (Last result in the past 365 days)        Color   Clarity   Blood   Leuk Est   Nitrite   Protein   CREAT   Urine HCG        03/31/25 1332 Yellow   Clear   Negative   Negative   Negative   Negative                   Last Urine Toxicity  More data exists         Latest Ref Rng & Units 3/31/2025 11/9/2022   LAST URINE TOXICITY RESULTS   Amphetamine, Urine Qual Negative Negative  -   Barbiturates Screen, Urine Negative Negative  -   Benzodiazepine Screen, Urine Negative Positive  -   Buprenorphine, Screen, Urine Negative Negative  <1       Cocaine Screen, Urine Negative Negative  -   Fentanyl, Urine Negative Negative  -   Hydromorphone <5 ng/mL - <5       Methadone Screen , Urine Negative Negative  -   Methamphetamine, Ur Negative Negative  <10          Details          This result is from an external source.               Chart, notes, vitals, labs personally reviewed.  Glucose 202, + hep C antibody  Outside [facility] report requested, reviewed, previously regularly prescribed lorazepam, last prescribed as 1 mg twice daily for 30 days on 2/7/2025, intermittently prescribed phentermine 37.5 mg daily, last on 1/7/2025  UDS results: + Benzo, THC  EKG tracing personally reviewed, interpreted as normal sinus rhythm, QTc interval 429  Consulted with  patient's therapist regarding clinical history and treatment plan    ASSESSMENT & PLAN:    Suicidal Ideation  -SI with plan  -Admit for crisis stabilization  -SP3    Bipolar 1 disorder, current episode mixed, severe, without psychosis  -Reinstate lamotrigine 50 mg twice daily.  Patient had been prescribed 200 mg twice daily, last taken on 3/4/2025.  -Reinstate desvenlafaxine 50 mg daily.  Patient had been prescribed 100 mg daily, last taken on 3/4/2025.  -Discontinue escitalopram and Rexulti to avoid polypharmacy  -We will establish outpatient psychiatric care following hospitalization    Posttraumatic stress disorder  -Medication as above  -We will establish outpatient psychiatric care following hospitalization    Borderline personality disorder  -Per patient report  -Refer for outpatient therapy, specifically DBT    Cannabis use disorder, severe, dependence  -Admission UDS positive  -Supportive care  -Ascertain substance abuse treatment plans following discharge    Hyperglycemia  -Admission glucose 202  -Order A1c    Hepatitis C  -Antibody positive  -Transaminases within normal limits    Hospital bed: No    The patient has been admitted for safety and stabilization.  Patient will be monitored for suicidality daily and maintained on Special Precautions Level 3 (q15 min checks) .  The patient will have individual and group therapy with a master's level therapist. A master treatment plan will be developed and agreed upon by the patient and his/her treatment team.  The patient's estimated length of stay in the hospital is 5-7 days.

## 2025-04-01 NOTE — DISCHARGE INSTR - APPOINTMENTS
Therapy appointment Tuesday 4/8 @ 10 am    [facility]  [address] [place] [address]  [phone]

## 2025-04-01 NOTE — PLAN OF CARE
Goal Outcome Evaluation:  Plan of Care Reviewed With: patient  Plan of Care Reviewed With: patient  Patient Agreement with Plan of Care: agrees     Progress: no change  Outcome Evaluation: Patient rates anxiety 9/10 depression 10/10 Reports suicidal ideations to hang herself Denies HI/AVH doesn't want to interact states she is having mental breakdown worried about court on Thursday. She wants to rest due to being tired. Her blood pressure was decreased during last check No acute distress noted/voiced safety monitoring of patient ongoing

## 2025-04-02 PROCEDURE — 99232 SBSQ HOSP IP/OBS MODERATE 35: CPT | Performed by: PSYCHIATRY & NEUROLOGY

## 2025-04-02 RX ORDER — OLANZAPINE 5 MG/1
5 TABLET ORAL NIGHTLY
Status: DISCONTINUED | OUTPATIENT
Start: 2025-04-02 | End: 2025-04-05 | Stop reason: HOSPADM

## 2025-04-02 RX ADMIN — LAMOTRIGINE 50 MG: 100 TABLET ORAL at 08:35

## 2025-04-02 RX ADMIN — OLANZAPINE 5 MG: 5 TABLET, FILM COATED ORAL at 21:26

## 2025-04-02 RX ADMIN — DESVENLAFAXINE 50 MG: 50 TABLET, FILM COATED, EXTENDED RELEASE ORAL at 08:35

## 2025-04-02 RX ADMIN — LAMOTRIGINE 50 MG: 100 TABLET ORAL at 21:26

## 2025-04-02 RX ADMIN — IBUPROFEN 400 MG: 400 TABLET, FILM COATED ORAL at 21:25

## 2025-04-02 NOTE — PHARMACY PATIENT ASSISTANCE
Pharmacy checked on price of Lybalvi. According to patient's plan, medication requires a prior authorization. PA has been submitted and approved through 4/2/2026. According to patient's plan, copay will be $0 for 1 month supply. No other issues identified at this time.     Thank you,    Laura Mix, PharmD  04/02/25  12:39 EDT

## 2025-04-02 NOTE — PLAN OF CARE
Problem: Adult Behavioral Health Plan of Care  Goal: Plan of Care Review  Outcome: Progressing  Flowsheets  Taken 4/2/2025 1240 by Julito Castellanos, RN  Progress: no change  Outcome Evaluation: PATIENT VERBALIZES SEVERE ANXIETY AND DEPRESSION AND WILL NOT ANSWER ANTY QUESTIONS ABOUT SI [place]. PATIENT VERBLIZES CONSTIPATION BUT REFUSED ANY PRN MEDICATIONS TO RELIEVE THE PROBLEM. PATIENT DENIES ANY HI OR AVH. PATIENT IS AOX3, COOPERATIVE WITH NO S/S OF ACUTE DISTRESS NOTED. NEW ORDERS: ZYPREXA 5MG AT HS  Taken 4/2/2025 0830 by Julito Castellanos, RN  Patient Agreement with Plan of Care: agrees  Taken 4/1/2025 2323 by Annamarie Butler RN  Plan of Care Reviewed With: patient   Goal Outcome Evaluation:  Plan of Care Reviewed With: patient  Patient Agreement with Plan of Care: agrees     Progress: no change  Outcome Evaluation: PATIENT VERBALIZES SEVERE ANXIETY AND DEPRESSION AND WILL NOT ANSWER ANTY QUESTIONS ABOUT SI [place] AHIFT. PATIENT VERBLIZES CONSTIPATION BUT REFUSED ANY PRN MEDICATIONS TO RELIEVE THE PROBLEM. PATIENT DENIES ANY HI OR AVH. PATIENT IS AOX3, COOPERATIVE WITH NO S/S OF ACUTE DISTRESS NOTED. NEW ORDERS: ZYPREXA 5MG AT HS

## 2025-04-02 NOTE — NURSING NOTE
PATIENT IS NOT EATING WELL. PATIENT WAS ENCOURAGED TO EAT AND OFFERED FLUIDS. PATIENT REFUSED AT THIS TIME.

## 2025-04-02 NOTE — PLAN OF CARE
Goal Outcome Evaluation:           Progress: improving  Outcome Evaluation: Therapist met with patien to review care plan, social history, aftercare recommendation, and disposition plan.           Problem: Adult Behavioral Health Plan of Care  Goal: Patient-Specific Goal (Individualization)  Outcome: Progressing  Flowsheets  Taken 4/1/2025 1018  Patient/Family-Specific Goals (Include Timeframe): Identify 2-3 coping skills, address relapse prevention methods, complete aftercare plan, complete safety plan, and deny SI/HI within 1-7 days.  Individualized Care Needs: Therapist to offer 1-4 therapy sessions, aftercare planning, safety planning, daily groups, and brief CBT/MI interventions.  Anxieties, Fears or Concerns: None stated  Taken 4/1/2025 1009  Patient Personal Strengths:   resilient   resourceful   self-reliant  Patient Vulnerabilities:   history of unsuccessful treatment   lacks insight into illness   poor impulse control  Goal: Optimized Coping Skills in Response to Life Stressors  Outcome: Progressing  Intervention: Promote Effective Coping Strategies  Flowsheets (Taken 4/2/2025 0935)  Supportive Measures:   active listening utilized   counseling provided   decision-making supported   positive reinforcement provided   mindfulness techniques promoted   self-reflection promoted   self-responsibility promoted   verbalization of feelings encouraged  Goal: Develops/Participates in Therapeutic [employer] to Support Successful Transition  Outcome: Progressing  Intervention: Foster Therapeutic [employer]  Flowsheets (Taken 4/2/2025 0935)  Trust Relationship/Rapport:   care explained   choices provided   reassurance provided   thoughts/feelings acknowledged   emotional support provided   empathic listening provided   questions answered   questions encouraged  Intervention: Mutually Develop Transition Plan  Flowsheets  Taken 4/1/2025 1516 by Vipul Reis  Readmission Within the Last 30 Days: no previous admission in last  "30 days  Taken 4/1/2025 1018 by Vipul Reis  Outpatient/Agency/Support Group Needs:   outpatient counseling   outpatient medication management  Discharge Coordination/Progress:   Therapist met with patient to complete discharge needs assessment   patient considering outpatient services.  Transition Support:   crisis management plan promoted   crisis management plan verbalized   follow-up care discussed  Anticipated Discharge Disposition: home with family  Current Discharge Risk: substance use/abuse  Concerns to be Addressed:   mental health   medication  Taken 3/31/2025 1537 by Julito Castellanos, RN  Transportation Anticipated: family or friend will provide  Patient/Family Anticipated Services at Transition: mental health services  Patient/Family Anticipates Transition to: home with family     DATA:Therapist met with Patient individually this date. Patient agreeable to discuss current treatment progress and discharge concerns.     CLINICAL MANUVERING/INTERVENTIONS:  Assisted Patient in processing session content; acknowledged and normalized Patient’s thoughts, feelings, and concerns by utilizing a person-centered approach in efforts to build appropriate rapport and a positive therapeutic relationship with open and honest communication. Allowed Patient to ventilate regarding current stressors and triggers for negative emotions and thoughts in a safe nonjudgmental environment with unconditional positive regard, active listening skills, and empathy.      ASSESSMENT: Patient seen 1:1 bedside for follow up. Therapist encouraged patient to speak in the office to provide more therapy and patient declined. Patient states that she has seen no changes. Patient reports that her depression and anxiety are the same, both rated at 8/10. Patient denies engaging treatment or therapy fully and states that \"it won't help\". When asked if therapist can make family contact, patient asks \"Why the fuck would you need to do that?\" " Therapist reiterated that this is part of obtaining a safe disposition plan; patient refuses. Patient denies SI/HI/AVH.     PLAN: Patient will continue stabilization. Patient will continue to receive services offered by Treatment Team.     Patient will follow-up with CR.

## 2025-04-02 NOTE — PROGRESS NOTES
"INPATIENT PSYCHIATRIC PROGRESS NOTE    Name:  Cierra Perry  :  1990  MRN:  [id number]  Visit Number:  [id number]  Length of stay:  2    SUBJECTIVE    CC/Focus of Exam: SI, mood    INTERVAL HISTORY:  No significant changes today. Pt with increased depression, low mood, hopelessness, mild mood lability, anxiety, SI. Resuming previous medications at lower dose due to month without medicine. Sleep fair. Encouraged engagement in the milieu.    Depression rating 8/10  Anxiety rating 8/10  Sleep: fair, 6h  Withdrawal sx: denied  Cravin/10    Review of Systems   Constitutional: Negative.    Respiratory: Negative.     Cardiovascular: Negative.    Gastrointestinal: Negative.    Musculoskeletal: Negative.    Psychiatric/Behavioral:  Positive for dysphoric mood and suicidal ideas. The patient is nervous/anxious.        OBJECTIVE    Temp:  [97.1 °F (36.2 °C)-98 °F (36.7 °C)] 97.8 °F (36.6 °C)  Heart Rate:  [82-86] 86  Resp:  [16-17] 17  BP: (120-143)/(77-98) 131/85    MENTAL STATUS EXAM:  Appearance: Casually dressed, fair hygeine.   Cooperation: Cooperative  Psychomotor: mild psychomotor agitation/retardation, No EPS, No motor tics  Speech: normal rate, amount.  Mood: \"same\"   Affect: congruent, anxious, mildly irritable  Thought Content: goal directed, no delusional material present  Thought process: linear, organized.  Suicidality: +SI  Homicidality: No HI  Perception: No AH/VH  Insight: fair   Judgment: fair    Lab Results (last 24 hours)       ** No results found for the last 24 hours. **               Imaging Results (Last 24 Hours)       ** No results found for the last 24 hours. **               ECG/EMG Results (most recent)       Procedure Component Value Units Date/Time    ECG 12 Lead Other; Baseline Cardiac Status [[id number]] Collected: 25 1546     Updated: 25 1112     QT Interval 370 ms      QTC Interval 429 ms     Narrative:      Test Reason : Other~  Blood Pressure :   */*   mmHG  Vent. " Rate :  81 BPM     Atrial Rate :  81 BPM     P-R Int : 136 ms          QRS Dur :  76 ms      QT Int : 370 ms       P-R-T Axes :  18  69  27 degrees    QTcB Int : 429 ms    Normal sinus rhythm  Normal ECG  When compared with ECG of 02-Nov-2022 23:59,  No significant change was found  Confirmed by Rosangela Noel ([id number]) on 4/1/2025 11:12:08 AM    Referred By: QUINN           Confirmed By: Rosangela Noel             ALLERGIES: Hydroxyzine, Geodon [ziprasidone hcl], and Shellfish-derived products      Current Facility-Administered Medications:     acetaminophen (TYLENOL) tablet 650 mg, 650 mg, Oral, Q6H PRN, Luis Winter MD    albuterol sulfate HFA (PROVENTIL HFA;VENTOLIN HFA;PROAIR HFA) inhaler 2 puff, 2 puff, Inhalation, Q4H PRN, Luis Winter MD    aluminum-magnesium hydroxide-simethicone (MAALOX MAX) 400-400-40 MG/5ML suspension 15 mL, 15 mL, Oral, Q6H PRN, Luis Winter MD    benzonatate (TESSALON) capsule 100 mg, 100 mg, Oral, TID PRN, Luis Winter MD    benztropine (COGENTIN) tablet 2 mg, 2 mg, Oral, Once PRN **OR** benztropine (COGENTIN) injection 1 mg, 1 mg, Intramuscular, Once PRN, Luis Winter MD    desvenlafaxine (PRISTIQ) 24 hr tablet 50 mg, 50 mg, Oral, Daily, Devyn Swain MD, 50 mg at 04/02/25 0835    famotidine (PEPCID) tablet 20 mg, 20 mg, Oral, BID PRN, Luis Winter MD    hydrALAZINE (APRESOLINE) tablet 25 mg, 25 mg, Oral, TID PRN, Devyn Swain MD    ibuprofen (ADVIL,MOTRIN) tablet 400 mg, 400 mg, Oral, Q6H PRN, Luis Winter MD    lamoTRIgine (LaMICtal) tablet 50 mg, 50 mg, Oral, BID, Devyn Swain MD, 50 mg at 04/02/25 0835    loperamide (IMODIUM) capsule 2 mg, 2 mg, Oral, Q2H PRN, Luis Winter MD    magnesium hydroxide (MILK OF MAGNESIA) suspension 10 mL, 10 mL, Oral, Daily PRN, Luis Winter MD    nicotine (NICODERM CQ) 21 MG/24HR patch 1 patch, 1 patch, Transdermal, Q24H, Luis Winter MD, 1 patch at 04/01/25 1020    ondansetron ODT (ZOFRAN-ODT) disintegrating tablet 4 mg, 4  mg, Translingual, Q6H PRN, Luis Winter MD    polyethylene glycol (MIRALAX) packet 17 g, 17 g, Oral, Daily PRN, Luis Winter MD    sodium chloride nasal spray 2 spray, 2 spray, Each Nare, Moy WYNNE Mazhar, MD    traZODone (DESYREL) tablet 50 mg, 50 mg, Oral, Nightly PRN, Luis Winter MD, 50 mg at 04/01/25 2046    Reviewed chart, notes, vitals, labs and EKG personally reviewed.    ASSESSMENT & PLAN:    Suicidal Ideation  -SI with plan  -Admit for crisis stabilization  -SP3     Bipolar 1 disorder, current episode mixed, severe, without psychosis  -Reinstated lamotrigine 50 mg twice daily.  Patient had been prescribed 200 mg twice daily, last taken on 3/4/2025.  -Reinstated desvenlafaxine 50 mg daily.  Patient had been prescribed 100 mg daily, last taken on 3/4/2025.  -Discontinue escitalopram and Rexulti to avoid polypharmacy  -Begin olanzapine 5mg nightly. Plan to transition to [facility]  -We will establish outpatient psychiatric care following hospitalization     Posttraumatic stress disorder  -Medication as above  -We will establish outpatient psychiatric care following hospitalization     Borderline personality disorder  -Per patient report  -Refer for outpatient therapy, specifically DBT     Cannabis use disorder, severe, dependence  -Admission UDS positive  -Supportive care  -Ascertain substance abuse treatment plans following discharge     Hyperglycemia  -Admission glucose 202  -Reviewed A1c, normal at 5.4     Hepatitis C  -Antibody positive  -Transaminases within normal limits     Hospital bed: No     The patient has been admitted for safety and stabilization.  Patient will be monitored for suicidality daily and maintained on Special Precautions Level 3 (q15 min checks) .  The patient will have individual and group therapy with a master's level therapist. A master treatment plan will be developed and agreed upon by the patient and his/her treatment team.  The patient's estimated length of stay in the  hospital is 5-7 days.     Special precautions: Special Precautions Level 3 (q15 min checks)     Behavioral Health Treatment Plan and Problem List: I have reviewed and approved the Behavioral Health Treatment Plan and Problem list.  The patient has had a chance to review and agrees with the treatment plan.    I have reviewed the copied text and it is accurate as of 04/02/25     Clinician:  Devyn Swain MD  04/02/25  11:26 EDT

## 2025-04-02 NOTE — PLAN OF CARE
Goal Outcome Evaluation:  Plan of Care Reviewed With: patient  Plan of Care Reviewed With: patient  Patient Agreement with Plan of Care: agrees     Progress: improving  Outcome Evaluation: Pt calm and cooperative during assessment. Pt rated anxiety 9/10, depression 10/10. Pt denies SI/HI/AVH. Pt reports poor sleep and good appettie. Pt c/o constipation. Encouraged Pt to take PRN medication for constipation and increase fluid intake to increase motility. Pt refused PRN medication for constipation.

## 2025-04-03 PROCEDURE — 99232 SBSQ HOSP IP/OBS MODERATE 35: CPT | Performed by: PSYCHIATRY & NEUROLOGY

## 2025-04-03 RX ADMIN — OLANZAPINE 5 MG: 5 TABLET, FILM COATED ORAL at 20:39

## 2025-04-03 RX ADMIN — LAMOTRIGINE 50 MG: 100 TABLET ORAL at 20:39

## 2025-04-03 RX ADMIN — DESVENLAFAXINE 50 MG: 50 TABLET, FILM COATED, EXTENDED RELEASE ORAL at 08:28

## 2025-04-03 RX ADMIN — ACETAMINOPHEN 650 MG: 325 TABLET ORAL at 17:57

## 2025-04-03 RX ADMIN — LAMOTRIGINE 50 MG: 100 TABLET ORAL at 08:28

## 2025-04-03 NOTE — PROGRESS NOTES
"INPATIENT PSYCHIATRIC PROGRESS NOTE    Name:  Cierra Perry  :  1990  MRN:  [id number]  Visit Number:  [id number]  Length of stay:  3    SUBJECTIVE    CC/Focus of Exam: SI, mood    INTERVAL HISTORY:  Patient reports some improvement in mood, anxiety and SI today.  She has been irritable and poorly engaged thus far, minimal intake for unspecified reason.  She reports feeling better to me this morning.  She spoke with her mother last night, which went well.  SI improving.  Tolerating medications thus far.  Sleep fair. Encouraged engagement in the milieu.    Depression rating 6/10  Anxiety rating 6/10  Sleep: fair  Withdrawal sx: denied  Cravin10    Review of Systems   Constitutional: Negative.    Respiratory: Negative.     Cardiovascular: Negative.    Gastrointestinal: Negative.    Musculoskeletal: Negative.    Psychiatric/Behavioral:  Positive for dysphoric mood and suicidal ideas. The patient is nervous/anxious.        OBJECTIVE    Temp:  [97.4 °F (36.3 °C)-97.9 °F (36.6 °C)] 97.9 °F (36.6 °C)  Heart Rate:  [92-93] 93  Resp:  [16-18] 18  BP: (128-144)/(80-83) 128/80    MENTAL STATUS EXAM:  Appearance: Casually dressed, fair hygeine.   Cooperation: Cooperative  Psychomotor: mild psychomotor agitation/retardation, No EPS, No motor tics  Speech: normal rate, amount.  Mood: \"A little better\"   Affect: congruent, anxious, mildly irritable  Thought Content: goal directed, no delusional material present  Thought process: linear, organized.  Suicidality: Improving SI  Homicidality: No HI  Perception: No AH/VH  Insight: fair   Judgment: fair    Lab Results (last 24 hours)       ** No results found for the last 24 hours. **               Imaging Results (Last 24 Hours)       ** No results found for the last 24 hours. **               ECG/EMG Results (most recent)       Procedure Component Value Units Date/Time    ECG 12 Lead Other; Baseline Cardiac Status [[id number]] Collected: 25 154     Updated: " 04/01/25 1112     QT Interval 370 ms      QTC Interval 429 ms     Narrative:      Test Reason : Other~  Blood Pressure :   */*   mmHG  Vent. Rate :  81 BPM     Atrial Rate :  81 BPM     P-R Int : 136 ms          QRS Dur :  76 ms      QT Int : 370 ms       P-R-T Axes :  18  69  27 degrees    QTcB Int : 429 ms    Normal sinus rhythm  Normal ECG  When compared with ECG of 02-Nov-2022 23:59,  No significant change was found  Confirmed by Rosangela Noel ([id number]) on 4/1/2025 11:12:08 AM    Referred By: QUINN           Confirmed By: Rosangela Noel             ALLERGIES: Hydroxyzine, Geodon [ziprasidone hcl], and Shellfish-derived products      Current Facility-Administered Medications:     acetaminophen (TYLENOL) tablet 650 mg, 650 mg, Oral, Q6H PRN, Luis Winter MD    albuterol sulfate HFA (PROVENTIL HFA;VENTOLIN HFA;PROAIR HFA) inhaler 2 puff, 2 puff, Inhalation, Q4H PRN, Luis Winter MD    aluminum-magnesium hydroxide-simethicone (MAALOX MAX) 400-400-40 MG/5ML suspension 15 mL, 15 mL, Oral, Q6H PRN, Luis Winter MD    benzonatate (TESSALON) capsule 100 mg, 100 mg, Oral, TID PRN, Luis Winter MD    benztropine (COGENTIN) tablet 2 mg, 2 mg, Oral, Once PRN **OR** benztropine (COGENTIN) injection 1 mg, 1 mg, Intramuscular, Once PRN, Luis Winter MD    desvenlafaxine (PRISTIQ) 24 hr tablet 50 mg, 50 mg, Oral, Daily, Devyn Swain MD, 50 mg at 04/03/25 0828    famotidine (PEPCID) tablet 20 mg, 20 mg, Oral, BID PRN, Luis Winter MD    hydrALAZINE (APRESOLINE) tablet 25 mg, 25 mg, Oral, TID PRN, Devyn Swain MD    ibuprofen (ADVIL,MOTRIN) tablet 400 mg, 400 mg, Oral, Q6H PRN, Luis Winter MD, 400 mg at 04/02/25 2125    lamoTRIgine (LaMICtal) tablet 50 mg, 50 mg, Oral, BID, Devyn Swain MD, 50 mg at 04/03/25 0828    loperamide (IMODIUM) capsule 2 mg, 2 mg, Oral, Q2H PRN, Luis Winter MD    magnesium hydroxide (MILK OF MAGNESIA) suspension 10 mL, 10 mL, Oral, Daily PRN, Luis Winter MD    nicotine  (NICODERM CQ) 21 MG/24HR patch 1 patch, 1 patch, Transdermal, Q24H, Luis Winter MD, 1 patch at 04/01/25 1020    OLANZapine (zyPREXA) tablet 5 mg, 5 mg, Oral, Nightly, Devyn Swain MD, 5 mg at 04/02/25 2126    ondansetron ODT (ZOFRAN-ODT) disintegrating tablet 4 mg, 4 mg, Translingual, Q6H PRN, Luis Winter MD    polyethylene glycol (MIRALAX) packet 17 g, 17 g, Oral, Daily PRN, Luis Winter MD    sodium chloride nasal spray 2 spray, 2 spray, Each Nare, PRN, Luis Winter MD    traZODone (DESYREL) tablet 50 mg, 50 mg, Oral, Nightly PRN, Luis Winter MD, 50 mg at 04/01/25 2046    Reviewed chart, notes, vitals, labs and EKG personally reviewed.    ASSESSMENT & PLAN:    Suicidal Ideation  -SI with plan  -Admit for crisis stabilization  -SP3     Bipolar 1 disorder, current episode mixed, severe, without psychosis  -Reinstated lamotrigine 50 mg twice daily.  Patient had been prescribed 200 mg twice daily, last taken on 3/4/2025.  -Reinstated desvenlafaxine 50 mg daily.  Patient had been prescribed 100 mg daily, last taken on 3/4/2025.  -Discontinue escitalopram and Rexulti to avoid polypharmacy  -Began olanzapine 5mg nightly on 4/2/2025. Plan to transition to [facility]  -We will establish outpatient psychiatric care following hospitalization     Posttraumatic stress disorder  -Medication as above  -We will establish outpatient psychiatric care following hospitalization     Borderline personality disorder  -Per patient report  -Refer for outpatient therapy, specifically DBT     Cannabis use disorder, severe, dependence  -Admission UDS positive  -Supportive care  -Ascertain substance abuse treatment plans following discharge     Hyperglycemia  -Admission glucose 202  -Reviewed A1c, normal at 5.4     Hepatitis C  -Antibody positive  -Transaminases within normal limits     Hospital bed: No     The patient has been admitted for safety and stabilization.  Patient will be monitored for suicidality daily and maintained  on Special Precautions Level 3 (q15 min checks) .  The patient will have individual and group therapy with a master's level therapist. A master treatment plan will be developed and agreed upon by the patient and his/her treatment team.  The patient's estimated length of stay in the hospital is 3-5 days.     Special precautions: Special Precautions Level 3 (q15 min checks)     Behavioral Health Treatment Plan and Problem List: I have reviewed and approved the Behavioral Health Treatment Plan and Problem list.  The patient has had a chance to review and agrees with the treatment plan.    I have reviewed the copied text and it is accurate as of 04/03/25     Clinician:  Devyn Swain MD  04/03/25  12:23 EDT

## 2025-04-03 NOTE — PLAN OF CARE
Goal Outcome Evaluation:  Plan of Care Reviewed With: patient  Plan of Care Reviewed With: patient  Patient Agreement with Plan of Care: agrees     Progress: no change          Patient rates anxiety 9, and depression 10, denies thoughts of harming self and others, and denies hallucinations.

## 2025-04-03 NOTE — PLAN OF CARE
Goal Outcome Evaluation:  Plan of Care Reviewed With: patient  Plan of Care Reviewed With: patient  Patient Agreement with Plan of Care: agrees     Progress: no change  Outcome Evaluation: Pt isolated in room most of this shift. Pt rated anxiety 9/10, depression 10/10. Pt denied SI/HI/AVH. Pt reported constipation. Refused PRN medication for consitipation. Pt educated on importance of taking medication.  Continued to refuse. Pt encouraged to increase fluid intake to promote bowel movement.

## 2025-04-03 NOTE — PLAN OF CARE
Goal Outcome Evaluation:           Progress: improving  Outcome Evaluation: Therapist met with patien to review care plan, social history, aftercare recommendation, and disposition plan.           Problem: Adult Behavioral Health Plan of Care  Goal: Patient-Specific Goal (Individualization)  Outcome: Progressing  Flowsheets  Taken 4/1/2025 1018  Patient/Family-Specific Goals (Include Timeframe): Identify 2-3 coping skills, address relapse prevention methods, complete aftercare plan, complete safety plan, and deny SI/HI within 1-7 days.  Individualized Care Needs: Therapist to offer 1-4 therapy sessions, aftercare planning, safety planning, daily groups, and brief CBT/MI interventions.  Anxieties, Fears or Concerns: None stated  Taken 4/1/2025 1009  Patient Personal Strengths:   resilient   resourceful   self-reliant  Patient Vulnerabilities:   history of unsuccessful treatment   lacks insight into illness   poor impulse control  Goal: Optimized Coping Skills in Response to Life Stressors  Outcome: Progressing  Intervention: Promote Effective Coping Strategies  Flowsheets (Taken 4/3/2025 1143)  Supportive Measures:   active listening utilized   counseling provided   decision-making supported   positive reinforcement provided   mindfulness techniques promoted   self-reflection promoted   self-responsibility promoted   verbalization of feelings encouraged  Goal: Develops/Participates in Therapeutic [place] to Support Successful Transition  Outcome: Progressing  Intervention: Foster Therapeutic [place]  Flowsheets (Taken 4/3/2025 1143)  Trust Relationship/Rapport:   care explained   choices provided   reassurance provided   thoughts/feelings acknowledged   emotional support provided   empathic listening provided   questions answered   questions encouraged  Intervention: Mutually Develop Transition Plan  Flowsheets  Taken 4/1/2025 1516 by Vipul Reis  Readmission Within the Last 30 Days: no previous admission in last  30 days  Taken 4/1/2025 1018 by Vipul Reis  Outpatient/Agency/Support Group Needs:   outpatient counseling   outpatient medication management  Discharge Coordination/Progress:   Therapist met with patient to complete discharge needs assessment   patient considering outpatient services.  Transition Support:   crisis management plan promoted   crisis management plan verbalized   follow-up care discussed  Anticipated Discharge Disposition: home with family  Current Discharge Risk: substance use/abuse  Concerns to be Addressed:   mental health   medication  Taken 3/31/2025 1537 by Julito Castellanos, RN  Transportation Anticipated: family or friend will provide  Patient/Family Anticipated Services at Transition: mental health services  Patient/Family Anticipates Transition to: home with family     DATA:Therapist met with Patient individually this date. Patient agreeable to discuss current treatment progress and discharge concerns.     CLINICAL MANUVERING/INTERVENTIONS:  Assisted Patient in processing session content; acknowledged and normalized Patient’s thoughts, feelings, and concerns by utilizing a person-centered approach in efforts to build appropriate rapport and a positive therapeutic relationship with open and honest communication. Allowed Patient to ventilate regarding current stressors and triggers for negative emotions and thoughts in a safe nonjudgmental environment with unconditional positive regard, active listening skills, and empathy.      ASSESSMENT: Patient was seen 1:1 for follow up. Patient remained in bed and continues to be minimally cooperative with therapy, however, patient presents with a markedly improved attitude toward staff. Patient spoke briefly regarding her depression and anxiety, stating that her depression was improving with her medications. Patient states that her anxiety remains heightened, but she is unsure why this is. Patient does state that she still intends to return home with  her mother upon discharge and that she spoke with her over the phone earlier in the date. Despite this, the patient continues to deny staff contact with family to complete safety/discharge planning. Patient denies SI/HI/AVH.     PLAN: Patient will continue stabilization. Patient will continue to receive services offered by Treatment Team.     Patient will follow-up with [facility].     Assistance with Transportation will not be needed. Family member will provide.

## 2025-04-04 PROCEDURE — 99232 SBSQ HOSP IP/OBS MODERATE 35: CPT | Performed by: PSYCHIATRY & NEUROLOGY

## 2025-04-04 RX ORDER — LAMOTRIGINE 100 MG/1
100 TABLET ORAL 2 TIMES DAILY
Status: DISCONTINUED | OUTPATIENT
Start: 2025-04-04 | End: 2025-04-05 | Stop reason: HOSPADM

## 2025-04-04 RX ADMIN — LAMOTRIGINE 100 MG: 100 TABLET ORAL at 20:12

## 2025-04-04 RX ADMIN — LAMOTRIGINE 50 MG: 100 TABLET ORAL at 09:56

## 2025-04-04 RX ADMIN — DESVENLAFAXINE 50 MG: 50 TABLET, FILM COATED, EXTENDED RELEASE ORAL at 09:56

## 2025-04-04 RX ADMIN — OLANZAPINE 5 MG: 5 TABLET, FILM COATED ORAL at 20:12

## 2025-04-04 RX ADMIN — NICOTINE 1 PATCH: 21 PATCH TRANSDERMAL at 14:44

## 2025-04-04 NOTE — NURSING NOTE
"Patient  reporting \" period for 20 days\" , stopping a week ago and beginning to \"spot \" again today. Patient requesting this RN notify  MD to request \" labs\", notified Dr. Swain states plan to order outpatient follow up if not bleeding heavily . Educated Patient including to notify staff if needed, verbalized understanding   "

## 2025-04-04 NOTE — PLAN OF CARE
Goal Outcome Evaluation:  Plan of Care Reviewed With: patient  Plan of Care Reviewed With: patient  Patient Agreement with Plan of Care: agrees     Progress: improving  Outcome Evaluation: Patient cooperative, interacting with stafff, denies suicidal or homicidal ideation

## 2025-04-04 NOTE — PLAN OF CARE
Goal Outcome Evaluation:           Progress: improving  Outcome Evaluation: Therapist met with patien to review care plan, social history, aftercare recommendation, and disposition plan.           Problem: Adult Behavioral Health Plan of Care  Goal: Patient-Specific Goal (Individualization)  Outcome: Progressing  Flowsheets  Taken 4/1/2025 1018  Patient/Family-Specific Goals (Include Timeframe): Identify 2-3 coping skills, address relapse prevention methods, complete aftercare plan, complete safety plan, and deny SI/HI within 1-7 days.  Individualized Care Needs: Therapist to offer 1-4 therapy sessions, aftercare planning, safety planning, daily groups, and brief CBT/MI interventions.  Anxieties, Fears or Concerns: None stated  Taken 4/1/2025 1009  Patient Personal Strengths:   resilient   resourceful   self-reliant  Patient Vulnerabilities:   history of unsuccessful treatment   lacks insight into illness   poor impulse control  Goal: Optimized Coping Skills in Response to Life Stressors  Outcome: Progressing  Intervention: Promote Effective Coping Strategies  Flowsheets (Taken 4/4/2025 141)  Supportive Measures:   active listening utilized   counseling provided   decision-making supported   positive reinforcement provided   mindfulness techniques promoted   problem-solving facilitated   self-reflection promoted   verbalization of feelings encouraged   self-responsibility promoted  Goal: Develops/Participates in Therapeutic [place] to Support Successful Transition  Outcome: Progressing  Intervention: Foster Therapeutic [place]  Flowsheets (Taken 4/4/2025 1419)  Trust Relationship/Rapport:   care explained   choices provided   reassurance provided   emotional support provided   questions encouraged   thoughts/feelings acknowledged   empathic listening provided   questions answered  Intervention: Mutually Develop Transition Plan  Flowsheets  Taken 4/1/2025 1516 by Vipul Reis  Readmission Within the Last 30 Days:  "no previous admission in last 30 days  Taken 4/1/2025 1018 by Vipul Reis  Outpatient/Agency/Support Group Needs:   outpatient counseling   outpatient medication management  Discharge Coordination/Progress:   Therapist met with patient to complete discharge needs assessment   patient considering outpatient services.  Transition Support:   crisis management plan promoted   crisis management plan verbalized   follow-up care discussed  Anticipated Discharge Disposition: home with family  Current Discharge Risk: substance use/abuse  Concerns to be Addressed:   mental health   medication  Taken 3/31/2025 1537 by Julito Castellanos, RN  Transportation Anticipated: family or friend will provide  Patient/Family Anticipated Services at Transition: mental health services  Patient/Family Anticipates Transition to: home with family     DATA:Therapist met with Patient individually this date. Patient agreeable to discuss current treatment progress and discharge concerns.     CLINICAL MANUVERING/INTERVENTIONS:  Assisted Patient in processing session content; acknowledged and normalized Patient’s thoughts, feelings, and concerns by utilizing a person-centered approach in efforts to build appropriate rapport and a positive therapeutic relationship with open and honest communication. Allowed Patient to ventilate regarding current stressors and triggers for negative emotions and thoughts in a safe nonjudgmental environment with unconditional positive regard, active listening skills, and empathy.     ASSESSMENT: Patient seen 1:1 for follow up. Patient continues to show improvements in overall affect and attitude with staff. Patient reports that she is \"doing well\". Patient states that she continues to experience high levels of depression, but this is nothing out of the ordinary for her. Patient states that her anxiety has been up as she is really missing her mom and would like to go home soon. Patient recognizes that her discharge " could be nearing over the weekend, but continues to decline family consents. Patient states that her mother will provide her transportation home. Patient denies SI/HI/AVH.      Safety planning completed with patient on this date. Patient agreeable to limited access to firearms, sharp objects, medications, and other potentially hazardous materials. Patient informed that distressing thoughts/emotions may return. Patient informed to contact [phone] or report to the nearest ED in the event that thoughts to harm themselves return.     PLAN: Patient will continue stabilization. Patient will continue to receive services offered by Treatment Team.     Patient will follow-up with [facility].    Assistance with Transportation will not be needed. Family member will provide.

## 2025-04-04 NOTE — PLAN OF CARE
Goal Outcome Evaluation:  Plan of Care Reviewed With: patient  Patient Agreement with Plan of Care: agrees        Outcome Evaluation: Denied SI, HI, AVH. Anxiety 8, depression 10. Mostly stayed in room during free time. Reports poor appetite.

## 2025-04-04 NOTE — PROGRESS NOTES
"INPATIENT PSYCHIATRIC PROGRESS NOTE    Name:  Cierra Perry  :  1990  MRN:  [id number]  Visit Number:  [id number]  Length of stay:  4    SUBJECTIVE    CC/Focus of Exam: SI, mood    INTERVAL HISTORY:  Patient reports continued improvement in mood, anxiety and SI today.  She is on the day room and participating more today.  She reports mood and anxiety are improving, SI improving.  Appetite is still very low, with minimal intake for several days.  She spoke with her mother last night, which went well. Tolerating medications thus far.  Sleep fair.  Patient reports recent dysmenorrhea.    Depression rating 6/10  Anxiety rating 6/10  Sleep: fair  Withdrawal sx: denied  Cravin/10    Review of Systems   Constitutional:  Positive for appetite change.   Respiratory: Negative.     Cardiovascular: Negative.    Gastrointestinal: Negative.    Musculoskeletal: Negative.    Psychiatric/Behavioral:  Positive for dysphoric mood. The patient is nervous/anxious.        OBJECTIVE    Temp:  [97.9 °F (36.6 °C)-98 °F (36.7 °C)] 97.9 °F (36.6 °C)  Heart Rate:  [] 101  Resp:  [16] 16  BP: (126-136)/(85-87) 136/87    MENTAL STATUS EXAM:  Appearance: Casually dressed, fair hygeine.   Cooperation: Cooperative  Psychomotor: mild psychomotor agitation/retardation, No EPS, No motor tics  Speech: normal rate, amount.  Mood: \"Getting better\"   Affect: congruent, anxious, mildly irritable  Thought Content: goal directed, no delusional material present  Thought process: linear, organized.  Suicidality: Improving SI  Homicidality: No HI  Perception: No AH/VH  Insight: fair   Judgment: fair    Lab Results (last 24 hours)       ** No results found for the last 24 hours. **               Imaging Results (Last 24 Hours)       ** No results found for the last 24 hours. **               ECG/EMG Results (most recent)       Procedure Component Value Units Date/Time    ECG 12 Lead Other; Baseline Cardiac Status [[id number]] Collected: " 03/31/25 1546     Updated: 04/01/25 1112     QT Interval 370 ms      QTC Interval 429 ms     Narrative:      Test Reason : Other~  Blood Pressure :   */*   mmHG  Vent. Rate :  81 BPM     Atrial Rate :  81 BPM     P-R Int : 136 ms          QRS Dur :  76 ms      QT Int : 370 ms       P-R-T Axes :  18  69  27 degrees    QTcB Int : 429 ms    Normal sinus rhythm  Normal ECG  When compared with ECG of 02-Nov-2022 23:59,  No significant change was found  Confirmed by Rosangela Noel ([id number]) on 4/1/2025 11:12:08 AM    Referred By: QUINN           Confirmed By: Rosangela Noel             ALLERGIES: Hydroxyzine, Geodon [ziprasidone hcl], and Shellfish-derived products      Current Facility-Administered Medications:     acetaminophen (TYLENOL) tablet 650 mg, 650 mg, Oral, Q6H PRN, Luis Winter MD, 650 mg at 04/03/25 1757    albuterol sulfate HFA (PROVENTIL HFA;VENTOLIN HFA;PROAIR HFA) inhaler 2 puff, 2 puff, Inhalation, Q4H PRN, Luis Winter MD    aluminum-magnesium hydroxide-simethicone (MAALOX MAX) 400-400-40 MG/5ML suspension 15 mL, 15 mL, Oral, Q6H PRN, Luis Winter MD    benzonatate (TESSALON) capsule 100 mg, 100 mg, Oral, TID PRN, Luis Winter MD    benztropine (COGENTIN) tablet 2 mg, 2 mg, Oral, Once PRN **OR** benztropine (COGENTIN) injection 1 mg, 1 mg, Intramuscular, Once PRN, Luis Winter MD    desvenlafaxine (PRISTIQ) 24 hr tablet 50 mg, 50 mg, Oral, Daily, Devyn Swain MD, 50 mg at 04/04/25 0956    famotidine (PEPCID) tablet 20 mg, 20 mg, Oral, BID PRN, Luis Winter MD    hydrALAZINE (APRESOLINE) tablet 25 mg, 25 mg, Oral, TID PRN, Devyn Swain MD    ibuprofen (ADVIL,MOTRIN) tablet 400 mg, 400 mg, Oral, Q6H PRN, Luis Winter MD, 400 mg at 04/02/25 2125    lamoTRIgine (LaMICtal) tablet 50 mg, 50 mg, Oral, BID, Devyn Swain MD, 50 mg at 04/04/25 0956    loperamide (IMODIUM) capsule 2 mg, 2 mg, Oral, Q2H PRN, Luis Winter MD    magnesium hydroxide (MILK OF MAGNESIA) suspension 10 mL, 10 mL,  Oral, Daily PRN, Luis Winter MD    nicotine (NICODERM CQ) 21 MG/24HR patch 1 patch, 1 patch, Transdermal, Q24H, Luis Winter MD, 1 patch at 04/01/25 1020    OLANZapine (zyPREXA) tablet 5 mg, 5 mg, Oral, Nightly, Devyn Swain MD, 5 mg at 04/03/25 2039    ondansetron ODT (ZOFRAN-ODT) disintegrating tablet 4 mg, 4 mg, Translingual, Q6H PRN, Luis Winter MD    polyethylene glycol (MIRALAX) packet 17 g, 17 g, Oral, Daily PRN, Luis Winter MD    sodium chloride nasal spray 2 spray, 2 spray, Each Nare, PRN, Luis Winter MD    traZODone (DESYREL) tablet 50 mg, 50 mg, Oral, Nightly PRN, Luis Winter MD, 50 mg at 04/01/25 2046    Reviewed chart, notes, vitals, labs and EKG personally reviewed.    ASSESSMENT & PLAN:    Suicidal Ideation  -SI with plan  -Admit for crisis stabilization  -SP3     Bipolar 1 disorder, current episode mixed, severe, without psychosis  -Increase lamotrigine to 100 mg twice daily.  Patient had been prescribed 200 mg twice daily, last taken on 3/4/2025.  -Reinstated desvenlafaxine 50 mg daily.  Patient had been prescribed 100 mg daily, last taken on 3/4/2025.  -Discontinued escitalopram and Rexulti to avoid polypharmacy  -Began olanzapine 5mg nightly on 4/2/2025. Plan to transition to [facility]  -We will establish outpatient psychiatric care following hospitalization     Posttraumatic stress disorder  -Medication as above  -We will establish outpatient psychiatric care following hospitalization     Borderline personality disorder  -Per patient report  -Refer for outpatient therapy, specifically DBT     Cannabis use disorder, severe, dependence  -Admission UDS positive  -Supportive care  -Ascertain substance abuse treatment plans following discharge     Hyperglycemia  -Admission glucose 202  -Reviewed A1c, normal at 5.4     Hepatitis C  -Antibody positive  -Transaminases within normal limits    Dysmenorrhea  -Refer to outpatient OB/GYN     Hospital bed: No     The patient has been  admitted for safety and stabilization.  Patient will be monitored for suicidality daily and maintained on Special Precautions Level 3 (q15 min checks) .  The patient will have individual and group therapy with a master's level therapist. A master treatment plan will be developed and agreed upon by the patient and his/her treatment team.  The patient's estimated length of stay in the hospital is 2-4 days.     Special precautions: Special Precautions Level 3 (q15 min checks)     Behavioral Health Treatment Plan and Problem List: I have reviewed and approved the Behavioral Health Treatment Plan and Problem list.  The patient has had a chance to review and agrees with the treatment plan.    I have reviewed the copied text and it is accurate as of 04/04/25     Clinician:  Devyn Swain MD  04/04/25  10:50 EDT

## 2025-04-05 VITALS
SYSTOLIC BLOOD PRESSURE: 154 MMHG | TEMPERATURE: 97.2 F | DIASTOLIC BLOOD PRESSURE: 89 MMHG | WEIGHT: 188.2 LBS | BODY MASS INDEX: 32.13 KG/M2 | RESPIRATION RATE: 20 BRPM | HEIGHT: 64 IN | OXYGEN SATURATION: 97 % | HEART RATE: 84 BPM

## 2025-04-05 PROCEDURE — 99239 HOSP IP/OBS DSCHRG MGMT >30: CPT | Performed by: PSYCHIATRY & NEUROLOGY

## 2025-04-05 RX ORDER — OLANZAPINE AND SAMIDORPHAN L-MALATE 5; 10 MG/1; MG/1
1 TABLET, FILM COATED ORAL DAILY
Qty: 30 TABLET | Refills: 0 | Status: SHIPPED | OUTPATIENT
Start: 2025-04-05

## 2025-04-05 RX ORDER — OLANZAPINE AND SAMIDORPHAN L-MALATE 5; 10 MG/1; MG/1
1 TABLET, FILM COATED ORAL DAILY
Qty: 30 TABLET | Refills: 0 | Status: SHIPPED | OUTPATIENT
Start: 2025-04-05 | End: 2025-04-05

## 2025-04-05 RX ORDER — LAMOTRIGINE 100 MG/1
100 TABLET ORAL 2 TIMES DAILY
Qty: 60 TABLET | Refills: 0 | Status: SHIPPED | OUTPATIENT
Start: 2025-04-05

## 2025-04-05 RX ORDER — OLANZAPINE 5 MG/1
5 TABLET ORAL NIGHTLY
Qty: 30 TABLET | Refills: 0 | Status: SHIPPED | OUTPATIENT
Start: 2025-04-05 | End: 2025-04-05

## 2025-04-05 RX ORDER — DESVENLAFAXINE 50 MG/1
50 TABLET, FILM COATED, EXTENDED RELEASE ORAL DAILY
Qty: 30 TABLET | Refills: 0 | Status: SHIPPED | OUTPATIENT
Start: 2025-04-06

## 2025-04-05 RX ADMIN — HYDRALAZINE HYDROCHLORIDE 25 MG: 25 TABLET ORAL at 10:42

## 2025-04-05 RX ADMIN — LAMOTRIGINE 100 MG: 100 TABLET ORAL at 08:14

## 2025-04-05 RX ADMIN — DESVENLAFAXINE 50 MG: 50 TABLET, FILM COATED, EXTENDED RELEASE ORAL at 08:14

## 2025-04-05 RX ADMIN — NICOTINE 1 PATCH: 21 PATCH TRANSDERMAL at 08:14

## 2025-04-05 NOTE — NURSING NOTE
"Patient vitals 150/101 heart rate  99, 168/106 heart rate 98 and 730 am vitals 138/76 heart rate 79 .Patient denies any complaints reports               \" anxious, happy to be discharging\". Patient had prn Hydralazine at 10:42 , current vitals 154/89 heart rate 84. Dr. Headley notified, aware ok to discharge   "

## 2025-04-05 NOTE — DISCHARGE SUMMARY
PSYCHIATRIC DISCHARGE SUMMARY     Patient Identification:  Name:  Cierra Perry  Age:  34 y.o.  Sex:  female  :  1990  MRN:  [id number]  Visit Number:  [id number]    Date of Admission:3/31/2025   Date of Discharge:  2025      Discharge Diagnosis:  Bipolar 1 disorder, current episode mixed, severe, without psychosis   Posttraumatic stress disorder   Borderline personality disorder   Admission Diagnosis:  Suicidal ideation [R45.851]     Hospital Course  Patient is a 34 y.o. female presented with complaints of SI worsening over the last month.  Patient has been without psychotropic medications since 2025.  Reported worsening of depressive symptoms with low mood, low energy, poor concentration, high anxiety, anhedonia, hopelessness, worthless, insomnia and suicidal ideations.  Patient has a history of bipolar disorder, carries the diagnosis of borderline personality disorder having highs and lows in a day and argumentativeness with her family and getting suicidal in the context of disagreements with family members.  Patient was admitted to [facility], seen by Dr. Swain she was treated for following conditions.    Bipolar 1 disorder, current episode mixed, severe, without psychosis  Started on lamotrigine at 50 mg twice daily and titrated at 100 mg p.o. twice daily by the time of the discharge and patient tolerated that he titration and at the time of the discharge her mood is not labile and she is pleasantly interactive hopeful for her family.  Reinstated desvenlafaxine 50 mg daily and she was taking 100 mg daily at the time of the discharge she was discharged with 50 daily and may not be indicated high dose of desvenlafaxine due to having risk of manic symptoms.  She was discontinued on Lexapro and XLT to avoid polypharmacy.    Posttraumatic stress disorder is treated with desvenlafaxine 50 mg daily and she will call the hospital for follow-up appointments on Monday    Borderline personality  "disorder-strongly encouraged for dialectical behavioral therapy and distress tolerance, mindfulness practice patient verbalized understanding.    Cannabis use disorder, severe, dependence-admission UDS was positive she was given supportive care and encouraged abstinence, encouraged substance use program.    Hepatitis C positive-transaminases are within normal limits encouraged outpatient appointments.    On the day of discharge, patient denied SI, HI or AVH. Patient was stable and appropriate by the conclusion of this admission, denying significant symptoms of mood, psychotic or thought disorder. Patient showed improvement of presenting symptoms and was deemed appropriate for discharge today.    Mental Status Exam upon discharge:   Mood \"good\"   Affect-congruent, appropriate, stable  Thought Content-goal directed, no delusional material present  Thought process-linear, organized.  Suicidality: No SI  Homicidality: No HI  Perception: No AH/VH    Procedures Performed  none       Consults:   Consults       No orders found from 3/2/2025 to 4/1/2025.        hCG qualitative is negative  CMP showed glucose of 202 otherwise within normal limits  Magnesium 2.1  Ethanol less than 10 mg/dL  CBC with differential showed hemoglobin of 11.9 MCHC of 30.3 and RDW of 16.0  Hepatitis C antibody reactive  Hemoglobin A1c 5.40  Urinalysis is negative for any infection  Urine drug screen is positive for THC and benzodiazepines, fentanyl negative    Pertinent Test Results:   Lab Results (last 7 days)       Procedure Component Value Units Date/Time    Hemoglobin A1c [[id number]]  (Normal) Collected: 03/31/25 1336    Specimen: Blood Updated: 04/01/25 0747     Hemoglobin A1C 5.40 %     Narrative:      Hemoglobin A1C Ranges:    Increased Risk for Diabetes  5.7% to 6.4%  Diabetes                     >= 6.5%  Diabetic Goal                < 7.0%    Hepatitis Panel, Acute [[id number]]  (Abnormal) Collected: 03/31/25 1336    Specimen: Blood Updated: " 03/31/25 1623     Hepatitis B Surface Ag Non-Reactive     Hep A IgM Non-Reactive     Hep B C IgM Non-Reactive     Hepatitis C Ab Reactive    Narrative:      Results may be falsely decreased if patient taking Biotin.             Condition on Discharge:  stable    Vital Signs  Temp:  [97.3 °F (36.3 °C)-97.9 °F (36.6 °C)] 97.3 °F (36.3 °C)  Heart Rate:  [79-87] 79  Resp:  [16-18] 18  BP: (131-147)/(76-82) 131/76    Discharge Disposition:  Home or Self Care    Discharge Medications:     Discharge Medications        New Medications        Instructions Start Date   OLANZapine 5 MG tablet  Commonly known as: zyPREXA   5 mg, Oral, Nightly             Changes to Medications        Instructions Start Date   desvenlafaxine 50 MG 24 hr tablet  Commonly known as: PRISTIQ  What changed:   medication strength  how much to take   50 mg, Oral, Daily   Start Date: April 6, 2025     lamoTRIgine 100 MG tablet  Commonly known as: LaMICtal  What changed:   medication strength  how much to take   100 mg, Oral, 2 Times Daily             Continue These Medications        Instructions Start Date   albuterol sulfate  (90 Base) MCG/ACT inhaler  Commonly known as: PROVENTIL HFA;VENTOLIN HFA;PROAIR HFA   2 puffs, Inhalation, Every 4 Hours PRN             Stop These Medications      escitalopram 20 MG tablet  Commonly known as: LEXAPRO     LORazepam 1 MG tablet  Commonly known as: ATIVAN     Rexulti 3 MG tablet  Generic drug: Brexpiprazole              Discharge Diet: Normal    Activity at Discharge: Normal    Follow-up Appointments  No future appointments.      Test Results Pending at Discharge  None     Time: I spent  31  minutes on this discharge activity which included: face-to-face encounter with the patient, reviewing the data in the system, coordination of the care with the nursing staff as well as consultants, documentation, and entering orders.      Clinician:   Edson Headley MD  04/05/25  09:28 EDT

## 2025-04-05 NOTE — NURSING NOTE
Per MD and  Patient request reviewed plan for discharge today and  safety plan  with Grace Banda, Mother [phone], agreeable including to provide transportation. Claribel, Therapist, present , witnessed, will notify Dr. Kaba

## 2025-04-05 NOTE — PROGRESS NOTES
Behavioral Health Discharge Summary             Please fax within 24 hours of discharge to [facility] at: [phone]      Member Name: Cierra Perry Member ID: [id number]   Authorization Number: [id number] Phone: No phone number listed   Member Address: No address listed   Discharge Date: 04/05/2025 Level of Care at Discharge: Inpatient mental health to outpatient mental health   Facility: [facility] Staff Completing Form: Jolene SALAZAR RN   If the member is being discharged directly to a residential or extended care program, please specify the type below.   __Private Child-Caring Facility (PCC) Residential/Group Home   __Private Child-Caring Facility (PCC) Therapeutic Foster Care   __Residential Treatment Facility (RTF)   __Psychiatric Residential Treatment Facility (PRTF I or II)   __Long-Term Acute Inpatient Hospital Services or Extended Care Unit (ECU)   __Other (please specify):    Brief discharge summary of treatment received (for follow up by the case management team): D/C clinical with list of medications and follow up appts given to patient upon discharge.     BRIEF SUMMARY OF RECOMMENDATIONS FOR ONGOING TREATMENT     Discharged to where: Home with her mother   Discharge diagnoses:    Axis I:    Axis II:    Axis III:    Axis IV:    Axis V:    Does the member understand his/her DX?  Yes          Medication     Dose     Schedule Supply/  Quantity  Given at Discharge RX Provided  Yes/No  If Rx Provided, Quantity RX Prior Auth Required  Yes/No Prior Auth  Completed   Pristiq 50mg Daily        Lamictal 100mg Two times daily        Lybalvi 5mg Daily                                                                    Does the member understand the reason for taking these medications? Yes                                                           FOLLOW-UP APPOINTMENTS   Please schedule within 7 days of discharge and provide appointment details for all referred services.     PCP/Other Providers Involved in Treatment:    Appointment Type: Outpatient mental health Provider Name: [facility] - [place] location Provider Phone: [phone] Appointment Date: 04/08/2025 Appointment Time: 10 AM EST     Assessment   (new to OP services)        Case Management    Is the member already enrolled in case management?  Yes/No  If yes, date the CM was notified:    If no, was the CM referral offered?  Yes/No  Accepted? Yes/No    Is the Release of Information in the chart? Yes/No:      Medication Management (for member discharged with psychiatric medications):      A&D Treatment (for member with substance abuse/   dependence in the past year):      Medical Condition (for member with a medical condition):    Other recommended treatment:    Do you have any concerns about the discharge plan?  No    If yes, explain:    Was the member involved in the discharge planning?  Yes    If no, explain:    Was a copy of the discharge plan provided to the member?  Yes    If no, explain:

## 2025-04-05 NOTE — PLAN OF CARE
"Goal Outcome Evaluation:  Plan of Care Reviewed With: patient  Patient Agreement with Plan of Care: agrees        Outcome Evaluation: Pt reports good appetite and fair sleep. A 5 D 6. Denies SI/HI/AVH. Pt reports stomach pain after eating that she describes as \"clenching\". Pt also reports vomiting \"bile\" three days ago. Pt states this stomach pain has a lot to do with her poor appetite recently.                             "

## 2025-04-05 NOTE — NURSING NOTE
"Pt reported stomach pain after eating, describing it as a \"clenching\" sensation. Pt reports this is why she has not been eating well recently. Pt also reported that 3 days ago she was vomiting \"bile\". Pt does not appear to be in distress. Dr. Rangel notified of pt's complaint, instructed this RN to tell pt to make the MD aware in the AM of her recent symptoms. No new orders at this time.   "

## 2025-04-05 NOTE — THERAPY DISCHARGE NOTE
Patient Name:  Cierra Perry  YOB: 1990  MRN: [id number]  Admit Date:  3/31/2025    Therapist staffed case with Dr. Headley. Patient is being discharged home on this date.     Therapist spoke with Patient's mother, Grace, on this date who confirmed she can come pick Patient up. Completed safety planning again, and mother confirmed Patient does not have access to any firearms in the home and it is safeguarded from all other potential weapons.     Aftercare is scheduled with KIM Lynch.     Electronically signed by:  Claribel Fong LCSW  04/05/25 09:54 EDT

## 2025-04-05 NOTE — PLAN OF CARE
Goal Outcome Evaluation:  Plan of Care Reviewed With: patient  Plan of Care Reviewed With: patient  Patient Agreement with Plan of Care: agrees     Progress: improving  Patient discharged, leaving the unit with belongings. Patient denies suicidal or homicidal ideation. Mother notified, agreeable, providing transportation